# Patient Record
Sex: FEMALE | Race: WHITE | NOT HISPANIC OR LATINO | Employment: OTHER | URBAN - METROPOLITAN AREA
[De-identification: names, ages, dates, MRNs, and addresses within clinical notes are randomized per-mention and may not be internally consistent; named-entity substitution may affect disease eponyms.]

---

## 2017-02-08 ENCOUNTER — HISTORICAL (OUTPATIENT)
Dept: ADMINISTRATIVE | Facility: HOSPITAL | Age: 72
End: 2017-02-08

## 2017-06-09 ENCOUNTER — HISTORICAL (OUTPATIENT)
Dept: ADMINISTRATIVE | Facility: HOSPITAL | Age: 72
End: 2017-06-09

## 2017-06-09 LAB
EST. AVERAGE GLUCOSE BLD GHB EST-MCNC: 120 MG/DL
HBA1C MFR BLD: 5.8 % (ref 4.2–6.3)

## 2017-06-23 ENCOUNTER — HISTORICAL (OUTPATIENT)
Dept: RADIOLOGY | Facility: HOSPITAL | Age: 72
End: 2017-06-23

## 2017-06-23 LAB — BMD RECOMMENDATION EXT: NORMAL

## 2017-10-13 ENCOUNTER — HISTORICAL (OUTPATIENT)
Dept: ADMINISTRATIVE | Facility: HOSPITAL | Age: 72
End: 2017-10-13

## 2017-10-13 LAB
EST. AVERAGE GLUCOSE BLD GHB EST-MCNC: 123 MG/DL
HBA1C MFR BLD: 5.9 % (ref 4.2–6.3)

## 2017-12-01 ENCOUNTER — HISTORICAL (OUTPATIENT)
Dept: ADMINISTRATIVE | Facility: HOSPITAL | Age: 72
End: 2017-12-01

## 2018-01-04 ENCOUNTER — HISTORICAL (OUTPATIENT)
Dept: ADMINISTRATIVE | Facility: HOSPITAL | Age: 73
End: 2018-01-04

## 2018-03-12 ENCOUNTER — HISTORICAL (OUTPATIENT)
Dept: ADMINISTRATIVE | Facility: HOSPITAL | Age: 73
End: 2018-03-12

## 2018-03-12 LAB
ALBUMIN SERPL-MCNC: 3.9 GM/DL (ref 3.4–5)
ALBUMIN/GLOB SERPL: 1.2 RATIO (ref 1.1–2)
ALP SERPL-CCNC: 81 UNIT/L (ref 38–126)
ALT SERPL-CCNC: 15 UNIT/L (ref 12–78)
AST SERPL-CCNC: 10 UNIT/L (ref 15–37)
BILIRUB SERPL-MCNC: 0.5 MG/DL (ref 0.2–1)
BILIRUBIN DIRECT+TOT PNL SERPL-MCNC: 0.1 MG/DL (ref 0–0.5)
BILIRUBIN DIRECT+TOT PNL SERPL-MCNC: 0.4 MG/DL (ref 0–0.8)
BUN SERPL-MCNC: 20 MG/DL (ref 7–18)
CALCIUM SERPL-MCNC: 9.7 MG/DL (ref 8.5–10.1)
CHLORIDE SERPL-SCNC: 108 MMOL/L (ref 98–107)
CHOLEST SERPL-MCNC: 120 MG/DL (ref 0–200)
CHOLEST/HDLC SERPL: 2.3 {RATIO} (ref 0–4)
CO2 SERPL-SCNC: 27 MMOL/L (ref 21–32)
CREAT SERPL-MCNC: 0.95 MG/DL (ref 0.55–1.02)
CREAT UR-MCNC: 77.8 MG/DL
EST. AVERAGE GLUCOSE BLD GHB EST-MCNC: 123 MG/DL
GLOBULIN SER-MCNC: 3.2 GM/DL (ref 2.4–3.5)
GLUCOSE SERPL-MCNC: 112 MG/DL (ref 74–106)
HBA1C MFR BLD: 5.9 % (ref 4.2–6.3)
HDLC SERPL-MCNC: 53 MG/DL (ref 35–60)
LDLC SERPL CALC-MCNC: 41 MG/DL (ref 0–129)
MICROALBUMIN UR-MCNC: 0.7 MG/DL
MICROALBUMIN/CREAT RATIO PNL UR: 9.1 MG/GM CR (ref 0–30)
POTASSIUM SERPL-SCNC: 4.4 MMOL/L (ref 3.5–5.1)
PROT SERPL-MCNC: 7.1 GM/DL (ref 6.4–8.2)
SODIUM SERPL-SCNC: 140 MMOL/L (ref 136–145)
TRIGL SERPL-MCNC: 130 MG/DL (ref 30–150)
VLDLC SERPL CALC-MCNC: 26 MG/DL

## 2018-09-12 ENCOUNTER — HISTORICAL (OUTPATIENT)
Dept: ADMINISTRATIVE | Facility: HOSPITAL | Age: 73
End: 2018-09-12

## 2018-09-12 LAB
EST. AVERAGE GLUCOSE BLD GHB EST-MCNC: 128 MG/DL
HBA1C MFR BLD: 6.1 % (ref 4.2–6.3)

## 2018-11-29 ENCOUNTER — HISTORICAL (OUTPATIENT)
Dept: CARDIOLOGY | Facility: HOSPITAL | Age: 73
End: 2018-11-29

## 2018-12-04 LAB
LEFT EYE DM RETINOPATHY: NEGATIVE
RIGHT EYE DM RETINOPATHY: NEGATIVE

## 2019-03-18 ENCOUNTER — HISTORICAL (OUTPATIENT)
Dept: ADMINISTRATIVE | Facility: HOSPITAL | Age: 74
End: 2019-03-18

## 2019-03-18 LAB
ALBUMIN SERPL-MCNC: 3.8 GM/DL (ref 3.4–5)
ALBUMIN/GLOB SERPL: 1.4 {RATIO}
ALP SERPL-CCNC: 79 UNIT/L (ref 38–126)
ALT SERPL-CCNC: 15 UNIT/L (ref 12–78)
AST SERPL-CCNC: 9 UNIT/L (ref 15–37)
BILIRUB SERPL-MCNC: 0.5 MG/DL (ref 0.2–1)
BILIRUBIN DIRECT+TOT PNL SERPL-MCNC: 0.1 MG/DL (ref 0–0.2)
BILIRUBIN DIRECT+TOT PNL SERPL-MCNC: 0.4 MG/DL (ref 0–0.8)
BUN SERPL-MCNC: 15 MG/DL (ref 7–18)
CALCIUM SERPL-MCNC: 9.1 MG/DL (ref 8.5–10.1)
CHLORIDE SERPL-SCNC: 106 MMOL/L (ref 98–107)
CHOLEST SERPL-MCNC: 117 MG/DL (ref 0–200)
CHOLEST/HDLC SERPL: 2.4 {RATIO} (ref 0–4)
CO2 SERPL-SCNC: 29 MMOL/L (ref 21–32)
CREAT SERPL-MCNC: 0.92 MG/DL (ref 0.55–1.02)
CREAT UR-MCNC: 82 MG/DL
EST. AVERAGE GLUCOSE BLD GHB EST-MCNC: 126 MG/DL
GLOBULIN SER-MCNC: 2.8 GM/DL (ref 2.4–3.5)
GLUCOSE SERPL-MCNC: 111 MG/DL (ref 74–106)
HBA1C MFR BLD: 6 % (ref 4.2–6.3)
HDLC SERPL-MCNC: 48 MG/DL (ref 35–60)
LDLC SERPL CALC-MCNC: 36 MG/DL (ref 0–129)
MICROALBUMIN UR-MCNC: 0.8 MG/DL
MICROALBUMIN/CREAT RATIO PNL UR: 9.8 MG/GM CR (ref 0–30)
POTASSIUM SERPL-SCNC: 4.7 MMOL/L (ref 3.5–5.1)
PROT SERPL-MCNC: 6.6 GM/DL (ref 6.4–8.2)
SODIUM SERPL-SCNC: 141 MMOL/L (ref 136–145)
TRIGL SERPL-MCNC: 165 MG/DL (ref 30–150)
VLDLC SERPL CALC-MCNC: 33 MG/DL

## 2019-07-22 ENCOUNTER — HISTORICAL (OUTPATIENT)
Dept: ADMINISTRATIVE | Facility: HOSPITAL | Age: 74
End: 2019-07-22

## 2019-07-22 LAB
EST. AVERAGE GLUCOSE BLD GHB EST-MCNC: 137 MG/DL
HBA1C MFR BLD: 6.4 % (ref 4.2–6.3)

## 2019-10-28 ENCOUNTER — HISTORICAL (OUTPATIENT)
Dept: ADMINISTRATIVE | Facility: HOSPITAL | Age: 74
End: 2019-10-28

## 2019-10-28 LAB
EST. AVERAGE GLUCOSE BLD GHB EST-MCNC: 123 MG/DL
HBA1C MFR BLD: 5.9 % (ref 4.2–6.3)

## 2020-03-03 ENCOUNTER — HISTORICAL (OUTPATIENT)
Dept: ADMINISTRATIVE | Facility: HOSPITAL | Age: 75
End: 2020-03-03

## 2020-03-03 LAB
ALBUMIN SERPL-MCNC: 3.4 GM/DL (ref 3.4–5)
ALBUMIN/GLOB SERPL: 1.2 RATIO (ref 1.1–2)
ALP SERPL-CCNC: 65 UNIT/L (ref 38–126)
ALT SERPL-CCNC: 15 UNIT/L (ref 12–78)
AST SERPL-CCNC: 10 UNIT/L (ref 15–37)
BILIRUB SERPL-MCNC: 0.4 MG/DL (ref 0.2–1)
BILIRUBIN DIRECT+TOT PNL SERPL-MCNC: 0.2 MG/DL (ref 0–0.5)
BILIRUBIN DIRECT+TOT PNL SERPL-MCNC: 0.2 MG/DL (ref 0–0.8)
BUN SERPL-MCNC: 24 MG/DL (ref 7–18)
CALCIUM SERPL-MCNC: 9 MG/DL (ref 8.5–10.1)
CHLORIDE SERPL-SCNC: 107 MMOL/L (ref 98–107)
CHOLEST SERPL-MCNC: 107 MG/DL (ref 0–200)
CHOLEST/HDLC SERPL: 2.2 {RATIO} (ref 0–4)
CO2 SERPL-SCNC: 28 MMOL/L (ref 21–32)
CREAT SERPL-MCNC: 0.97 MG/DL (ref 0.55–1.02)
CREAT UR-MCNC: 62 MG/DL
EST. AVERAGE GLUCOSE BLD GHB EST-MCNC: 123 MG/DL
GLOBULIN SER-MCNC: 2.8 GM/DL (ref 2.4–3.5)
GLUCOSE SERPL-MCNC: 98 MG/DL (ref 74–106)
HBA1C MFR BLD: 5.9 % (ref 4.2–6.3)
HDLC SERPL-MCNC: 48 MG/DL (ref 35–60)
LDLC SERPL CALC-MCNC: 39 MG/DL (ref 0–129)
MICROALBUMIN UR-MCNC: <0.5 MG/DL
MICROALBUMIN/CREAT RATIO PNL UR: <8.1 MG/GM CR (ref 0–30)
POTASSIUM SERPL-SCNC: 4.4 MMOL/L (ref 3.5–5.1)
PROT SERPL-MCNC: 6.2 GM/DL (ref 6.4–8.2)
SODIUM SERPL-SCNC: 142 MMOL/L (ref 136–145)
TRIGL SERPL-MCNC: 101 MG/DL (ref 30–150)
VLDLC SERPL CALC-MCNC: 20 MG/DL

## 2020-07-07 ENCOUNTER — HISTORICAL (OUTPATIENT)
Dept: ADMINISTRATIVE | Facility: HOSPITAL | Age: 75
End: 2020-07-07

## 2020-07-07 LAB
EST. AVERAGE GLUCOSE BLD GHB EST-MCNC: 122.6 MG/DL
HBA1C MFR BLD: 5.9 %

## 2020-09-03 ENCOUNTER — HISTORICAL (OUTPATIENT)
Dept: RADIOLOGY | Facility: HOSPITAL | Age: 75
End: 2020-09-03

## 2020-11-10 ENCOUNTER — HISTORICAL (OUTPATIENT)
Dept: ADMINISTRATIVE | Facility: HOSPITAL | Age: 75
End: 2020-11-10

## 2020-11-10 LAB
EST. AVERAGE GLUCOSE BLD GHB EST-MCNC: 125.5 MG/DL
HBA1C MFR BLD: 6 %

## 2021-03-16 ENCOUNTER — HISTORICAL (OUTPATIENT)
Dept: ADMINISTRATIVE | Facility: HOSPITAL | Age: 76
End: 2021-03-16

## 2021-03-16 LAB
ALBUMIN SERPL-MCNC: 3.4 GM/DL (ref 3.4–4.8)
ALBUMIN/GLOB SERPL: 1.5 RATIO (ref 1.1–2)
ALP SERPL-CCNC: 73 UNIT/L (ref 40–150)
ALT SERPL-CCNC: 8 UNIT/L (ref 0–55)
AST SERPL-CCNC: 11 UNIT/L (ref 5–34)
BILIRUB SERPL-MCNC: 0.4 MG/DL
BILIRUBIN DIRECT+TOT PNL SERPL-MCNC: 0.2 MG/DL (ref 0–0.5)
BILIRUBIN DIRECT+TOT PNL SERPL-MCNC: 0.2 MG/DL (ref 0–0.8)
BUN SERPL-MCNC: 16 MG/DL (ref 9.8–20.1)
CALCIUM SERPL-MCNC: 8.6 MG/DL (ref 8.4–10.2)
CHLORIDE SERPL-SCNC: 109 MMOL/L (ref 98–107)
CHOLEST SERPL-MCNC: 105 MG/DL
CHOLEST/HDLC SERPL: 3 {RATIO} (ref 0–5)
CO2 SERPL-SCNC: 26 MMOL/L (ref 23–31)
CREAT SERPL-MCNC: 0.81 MG/DL (ref 0.55–1.02)
CREAT UR-MCNC: 55.7 MG/DL (ref 45–106)
EST. AVERAGE GLUCOSE BLD GHB EST-MCNC: 116.9 MG/DL
GLOBULIN SER-MCNC: 2.3 GM/DL (ref 2.4–3.5)
GLUCOSE SERPL-MCNC: 94 MG/DL (ref 82–115)
HBA1C MFR BLD: 5.7 %
HDLC SERPL-MCNC: 39 MG/DL (ref 35–60)
LDLC SERPL CALC-MCNC: 47 MG/DL (ref 50–140)
MICROALBUMIN UR-MCNC: 5.2 UG/ML
MICROALBUMIN/CREAT RATIO PNL UR: 9.3 MG/GM CR (ref 0–30)
POTASSIUM SERPL-SCNC: 4.6 MMOL/L (ref 3.5–5.1)
PROT SERPL-MCNC: 5.7 GM/DL (ref 5.8–7.6)
SODIUM SERPL-SCNC: 143 MMOL/L (ref 136–145)
TRIGL SERPL-MCNC: 95 MG/DL (ref 37–140)
VLDLC SERPL CALC-MCNC: 19 MG/DL

## 2021-07-13 ENCOUNTER — HISTORICAL (OUTPATIENT)
Dept: ADMINISTRATIVE | Facility: HOSPITAL | Age: 76
End: 2021-07-13

## 2021-07-13 LAB
EST. AVERAGE GLUCOSE BLD GHB EST-MCNC: 128.4 MG/DL
HBA1C MFR BLD: 6.1 %
URATE SERPL-MCNC: 8.4 MG/DL (ref 2.6–6)

## 2021-10-20 LAB — NONINV COLON CA DNA+OCC BLD SCRN STL QL: POSITIVE

## 2021-11-11 ENCOUNTER — HISTORICAL (OUTPATIENT)
Dept: ADMINISTRATIVE | Facility: HOSPITAL | Age: 76
End: 2021-11-11

## 2021-11-11 LAB
EST. AVERAGE GLUCOSE BLD GHB EST-MCNC: 122.6 MG/DL
HBA1C MFR BLD: 5.9 %
URATE SERPL-MCNC: 6.3 MG/DL (ref 2.6–6)

## 2022-02-02 LAB — CRC RECOMMENDATION EXT: NORMAL

## 2022-03-10 ENCOUNTER — HISTORICAL (OUTPATIENT)
Dept: ADMINISTRATIVE | Facility: HOSPITAL | Age: 77
End: 2022-03-10

## 2022-03-10 LAB
ABS NEUT (OLG): 6.4 (ref 2.1–9.2)
ALBUMIN SERPL-MCNC: 3.5 G/DL (ref 3.4–4.8)
ALBUMIN/GLOB SERPL: 1.1 {RATIO} (ref 1.1–2)
ALP SERPL-CCNC: 72 U/L (ref 40–150)
ALT SERPL-CCNC: 10 U/L (ref 0–55)
AST SERPL-CCNC: 13 U/L (ref 5–34)
BASOPHILS # BLD AUTO: 0.1 10*3/UL (ref 0–0.2)
BASOPHILS NFR BLD AUTO: 1 %
BILIRUB SERPL-MCNC: 0.6 MG/DL
BILIRUBIN DIRECT+TOT PNL SERPL-MCNC: 0.2 (ref 0–0.5)
BILIRUBIN DIRECT+TOT PNL SERPL-MCNC: 0.4 (ref 0–0.8)
BUN SERPL-MCNC: 19 MG/DL (ref 9.8–20.1)
CALCIUM SERPL-MCNC: 9.6 MG/DL (ref 8.7–10.5)
CHLORIDE SERPL-SCNC: 105 MMOL/L (ref 98–107)
CHOLEST SERPL-MCNC: 116 MG/DL
CHOLEST/HDLC SERPL: 3 {RATIO} (ref 0–5)
CO2 SERPL-SCNC: 27 MMOL/L (ref 23–31)
CREAT SERPL-MCNC: 0.86 MG/DL (ref 0.55–1.02)
CREAT UR-MCNC: 109 MG/DL (ref 45–106)
EOSINOPHIL # BLD AUTO: 0.1 10*3/UL (ref 0–0.9)
EOSINOPHIL NFR BLD AUTO: 1 %
ERYTHROCYTE [DISTWIDTH] IN BLOOD BY AUTOMATED COUNT: 15.4 % (ref 11.5–17)
EST. AVERAGE GLUCOSE BLD GHB EST-MCNC: 119.8 MG/DL
GLOBULIN SER-MCNC: 3.1 G/DL (ref 2.4–3.5)
GLUCOSE SERPL-MCNC: 103 MG/DL (ref 82–115)
HBA1C MFR BLD: 5.8 %
HCT VFR BLD AUTO: 41.1 % (ref 37–47)
HDLC SERPL-MCNC: 43 MG/DL (ref 35–60)
HEMOLYSIS INTERF INDEX SERPL-ACNC: 5
HGB BLD-MCNC: 13.5 G/DL (ref 12–16)
ICTERIC INTERF INDEX SERPL-ACNC: 0
LDLC SERPL CALC-MCNC: 49 MG/DL (ref 50–140)
LIPEMIC INTERF INDEX SERPL-ACNC: 0
LYMPHOCYTES # BLD AUTO: 2.2 10*3/UL (ref 0.6–4.6)
LYMPHOCYTES NFR BLD AUTO: 23 %
MANUAL DIFF? (OHS): NO
MCH RBC QN AUTO: 28.6 PG (ref 27–31)
MCHC RBC AUTO-ENTMCNC: 32.8 G/DL (ref 33–36)
MCV RBC AUTO: 87.1 FL (ref 80–94)
MICROALBUMIN UR-MCNC: 11
MICROALBUMIN/CREAT RATIO PNL UR: 10.1 (ref 0–30)
MONOCYTES # BLD AUTO: 0.6 10*3/UL (ref 0.1–1.3)
MONOCYTES NFR BLD AUTO: 6 %
NEUTROPHILS # BLD AUTO: 6.4 10*3/UL (ref 2.1–9.2)
NEUTROPHILS NFR BLD AUTO: 68 %
PLATELET # BLD AUTO: 212 10*3/UL (ref 130–400)
PMV BLD AUTO: 11.1 FL (ref 9.4–12.4)
POTASSIUM SERPL-SCNC: 4 MMOL/L (ref 3.5–5.1)
PROT SERPL-MCNC: 6.6 G/DL (ref 5.8–7.6)
RBC # BLD AUTO: 4.72 10*6/UL (ref 4.2–5.4)
SODIUM SERPL-SCNC: 142 MMOL/L (ref 136–145)
TRIGL SERPL-MCNC: 119 MG/DL (ref 37–140)
VLDLC SERPL CALC-MCNC: 24 MG/DL
WBC # SPEC AUTO: 9.4 10*3/UL (ref 4.5–11.5)

## 2022-03-29 LAB
LEFT EYE DM RETINOPATHY: NEGATIVE
RIGHT EYE DM RETINOPATHY: NEGATIVE

## 2022-04-11 ENCOUNTER — HISTORICAL (OUTPATIENT)
Dept: ADMINISTRATIVE | Facility: HOSPITAL | Age: 77
End: 2022-04-11

## 2022-04-27 VITALS
BODY MASS INDEX: 42.17 KG/M2 | OXYGEN SATURATION: 100 % | HEIGHT: 64 IN | SYSTOLIC BLOOD PRESSURE: 130 MMHG | WEIGHT: 247 LBS | DIASTOLIC BLOOD PRESSURE: 62 MMHG

## 2022-04-30 NOTE — OP NOTE
Patient:   Jamilah Malone            MRN: [id number]            FIN: [id number]               Age:   72 years     Sex:  Female     :  1945   Associated Diagnoses:   None   Author:   Suleiman Luo MD      Preoperative Diagnosis: Cataract Left eye    Postoperative Diagnosis: Cataract Left eye    Procedure: Phacoemulsification with intaocular lens implantations Left eye    Surgeon: Suleiman Luo MD    Assistant: Khushboo Ridley, [facility]    Anestheisa: Topical    Complications: None    The patient was brought into the operating suite, where the patient was correctly identified as was the operative eye via timeout.  The patient was prepped and draped in a sterile ophthalmic fashion.  A lid speculum was placed in the operative eye and the microscope was brought into place.  A 1.0mm paracentesis was then made at (6) o'clock.  The anterior chamber was filled with Endocoat.  A (temporal) clear corneal incision was made with a 2.4 mm keratome.  A 6 mm corneal marking ring was used to jeremias the cornea centered over the visual axis.  A 5.00 mm continuous curvilinear capsulorhexis was fashioned using a cystotome and microcapsular forceps.  Hydrodissection and hydrodelineation was performed with upreserved 1% Xylocaine.  The nucleus was then phacoemulsified with the Abbott phacoemulsification hand-piece with a total of (2) EFX.  The cortex was then removed with the Eleazar I/A hand-piece. An RAKESH lens model (ZCB00) with a power of (21.5) was placed in the capsular bag.  The Helon was then removed from the eye with the Eleazar I/A hand piece. The anterior chamber was inflated and the wounds were hydrated with BSS.  The wounds were checked with Weck-Britany sponges and found to be watertight.  The lid speculum was removed and topical antibiotics were placed on the operative eye.  The patient was brought to PACU in good condition.      Surgery Date 17 [facility]

## 2022-04-30 NOTE — OP NOTE
Patient:   Jamilah Malone            MRN: [id number]            FIN: [id number]               Age:   72 years     Sex:  Female     :  1945   Associated Diagnoses:   None   Author:   Suleiman Luo MD      Preoperative Diagnosis: Cataract Right eye    Postoperative Diagnosis: Cataract Right eye    Procedure: Phacoemulsification with intaocular lens implantations Right eye    Surgeon: Suleiman Luo MD    Assistant: Khushboo Ridley, [facility]    Anestheisa: Topical    Complications: None    The patient was brought into the operating suite, where the patient was correctly identified as was the operative eye via timeout.  The patient was prepped and draped in a sterile ophthalmic fashion.  A lid speculum was placed in the operative eye and the microscope was brought into place.  A 1.0mm paracentesis was then made at (12) o'clock.  The anterior chamber was filled with Endocoat.  A (temporal) clear corneal incision was made with a 2.4 mm keratome.  A 6 mm corneal marking ring was used to jeremias the cornea centered over the visual axis.  A 5.00 mm continuous curvilinear capsulorhexis was fashioned using a cystotome and microcapsular forceps.  Hydrodissection and hydrodelineation was performed with upreserved 1% Xylocaine.  The nucleus was then phacoemulsified with the Abbott phacoemulsification hand-piece with a total of (2) EFX.  The cortex was then removed with the Eleazar I/A hand-piece. An RAKESH lens model (ZCB00) with a power of (21.5) was placed in the capsular bag.  The Helon was then removed from the eye with the Eleazar I/A hand piece. The anterior chamber was inflated and the wounds were hydrated with BSS.  The wounds were checked with Weck-Britany sponges and found to be watertight.  The lid speculum was removed and topical antibiotics were placed on the operative eye.  The patient was brought to PACU in good condition.        Surgery Date 17 [facility]

## 2022-06-30 DIAGNOSIS — E11.9 DIABETES MELLITUS WITHOUT COMPLICATION: Primary | ICD-10-CM

## 2022-07-05 ENCOUNTER — PATIENT OUTREACH (OUTPATIENT)
Dept: ADMINISTRATIVE | Facility: HOSPITAL | Age: 77
End: 2022-07-05

## 2022-07-05 NOTE — PROGRESS NOTES
Population Health Outreach.Records Received, hyper-linked into chart at this time. The following record(s)  below were uploaded for Health Maintenance .    6/23/2017-DEXA SCAN  10/20/2021-COLOGUARD  2/22/2022-COLONOSCOPY

## 2022-07-14 ENCOUNTER — TELEPHONE (OUTPATIENT)
Dept: INTERNAL MEDICINE | Facility: CLINIC | Age: 77
End: 2022-07-14
Payer: MEDICARE

## 2022-07-14 DIAGNOSIS — E11.9 TYPE 2 DIABETES MELLITUS WITHOUT COMPLICATION, UNSPECIFIED WHETHER LONG TERM INSULIN USE: Primary | ICD-10-CM

## 2022-07-14 NOTE — TELEPHONE ENCOUNTER
----- Message from Poonam Lejeune sent at 7/14/2022 11:25 AM CDT -----  Regarding: RE: Elysia Saleh 7/20/22 @1120  Pt informed of OV, labs, and check in protocol.  She verbalized understanding.   ----- Message -----  From: Flory Keane LPN  Sent: 7/14/2022  10:24 AM CDT  To: Cheryl Lejeune  Subject: Elysia Saleh 7/20/22 @1120                        Are there any outstanding tasks in the chart? Pt will need fasting labs    Is there any documentation of tasks? No    Has patient seen another physician, been to the ER, C, or admitted to hospital since last visit?    Has the patient done blood work or imaging since last visit?

## 2022-07-15 ENCOUNTER — LAB VISIT (OUTPATIENT)
Dept: LAB | Facility: HOSPITAL | Age: 77
End: 2022-07-15
Attending: INTERNAL MEDICINE
Payer: MEDICARE

## 2022-07-15 DIAGNOSIS — E11.9 TYPE 2 DIABETES MELLITUS WITHOUT COMPLICATION, UNSPECIFIED WHETHER LONG TERM INSULIN USE: ICD-10-CM

## 2022-07-15 LAB
EST. AVERAGE GLUCOSE BLD GHB EST-MCNC: 119.8 MG/DL
HBA1C MFR BLD: 5.8 %

## 2022-07-15 PROCEDURE — 36415 COLL VENOUS BLD VENIPUNCTURE: CPT

## 2022-07-15 PROCEDURE — 83036 HEMOGLOBIN GLYCOSYLATED A1C: CPT

## 2022-07-20 ENCOUNTER — OFFICE VISIT (OUTPATIENT)
Dept: INTERNAL MEDICINE | Facility: CLINIC | Age: 77
End: 2022-07-20
Payer: MEDICARE

## 2022-07-20 VITALS
OXYGEN SATURATION: 98 % | DIASTOLIC BLOOD PRESSURE: 64 MMHG | BODY MASS INDEX: 41.62 KG/M2 | HEART RATE: 76 BPM | WEIGHT: 243.81 LBS | HEIGHT: 64 IN | SYSTOLIC BLOOD PRESSURE: 118 MMHG | RESPIRATION RATE: 18 BRPM

## 2022-07-20 DIAGNOSIS — E55.9 VITAMIN D DEFICIENCY: ICD-10-CM

## 2022-07-20 DIAGNOSIS — I10 PRIMARY HYPERTENSION: ICD-10-CM

## 2022-07-20 DIAGNOSIS — I48.91 ATRIAL FIBRILLATION, UNSPECIFIED TYPE: ICD-10-CM

## 2022-07-20 DIAGNOSIS — F32.A DEPRESSION, UNSPECIFIED DEPRESSION TYPE: ICD-10-CM

## 2022-07-20 DIAGNOSIS — E66.01 MORBID (SEVERE) OBESITY DUE TO EXCESS CALORIES: Primary | ICD-10-CM

## 2022-07-20 DIAGNOSIS — Z78.0 MENOPAUSE: ICD-10-CM

## 2022-07-20 DIAGNOSIS — Z11.59 NEED FOR HEPATITIS C SCREENING TEST: ICD-10-CM

## 2022-07-20 DIAGNOSIS — E11.9 TYPE 2 DIABETES MELLITUS WITHOUT COMPLICATION, WITHOUT LONG-TERM CURRENT USE OF INSULIN: ICD-10-CM

## 2022-07-20 DIAGNOSIS — Z12.31 OTHER SCREENING MAMMOGRAM: ICD-10-CM

## 2022-07-20 PROBLEM — N20.0 KIDNEY STONES: Status: ACTIVE | Noted: 2022-07-20

## 2022-07-20 PROBLEM — E78.5 HYPERLIPIDEMIA: Status: ACTIVE | Noted: 2022-07-20

## 2022-07-20 PROBLEM — N20.0 KIDNEY STONES: Status: RESOLVED | Noted: 2022-07-20 | Resolved: 2022-07-20

## 2022-07-20 PROBLEM — M19.90 OSTEOARTHRITIS: Status: ACTIVE | Noted: 2022-07-20

## 2022-07-20 PROBLEM — M10.9 GOUT: Status: ACTIVE | Noted: 2022-07-20

## 2022-07-20 PROBLEM — K21.9 GERD (GASTROESOPHAGEAL REFLUX DISEASE): Status: ACTIVE | Noted: 2022-07-20

## 2022-07-20 PROBLEM — M85.80 OSTEOPENIA: Status: ACTIVE | Noted: 2022-07-20

## 2022-07-20 PROCEDURE — 99213 PR OFFICE/OUTPT VISIT, EST, LEVL III, 20-29 MIN: ICD-10-PCS | Mod: ,,, | Performed by: INTERNAL MEDICINE

## 2022-07-20 PROCEDURE — 99213 OFFICE O/P EST LOW 20 MIN: CPT | Mod: ,,, | Performed by: INTERNAL MEDICINE

## 2022-07-20 RX ORDER — ATORVASTATIN CALCIUM 20 MG/1
TABLET, FILM COATED ORAL
COMMUNITY
Start: 2021-12-08 | End: 2022-09-19

## 2022-07-20 RX ORDER — METFORMIN HYDROCHLORIDE 500 MG/1
TABLET ORAL
COMMUNITY
Start: 2022-03-28 | End: 2023-05-30

## 2022-07-20 RX ORDER — MINERAL OIL
180 ENEMA (ML) RECTAL DAILY
COMMUNITY

## 2022-07-20 RX ORDER — APIXABAN 5 MG/1
5 TABLET, FILM COATED ORAL 2 TIMES DAILY
COMMUNITY
Start: 2022-06-28

## 2022-07-20 RX ORDER — LISINOPRIL 20 MG/1
TABLET ORAL
COMMUNITY
Start: 2022-01-19 | End: 2022-08-29

## 2022-07-20 RX ORDER — ALLOPURINOL 100 MG/1
100 TABLET ORAL
COMMUNITY
Start: 2022-02-01 | End: 2022-12-28

## 2022-07-20 RX ORDER — DEXTROMETHORPHAN HYDROBROMIDE, GUAIFENESIN 5; 100 MG/5ML; MG/5ML
650 LIQUID ORAL EVERY 8 HOURS PRN
COMMUNITY

## 2022-07-20 RX ORDER — ESOMEPRAZOLE MAGNESIUM 40 MG/1
40 CAPSULE, DELAYED RELEASE ORAL
COMMUNITY

## 2022-07-20 RX ORDER — DRONEDARONE 400 MG/1
1 TABLET, FILM COATED ORAL 2 TIMES DAILY
COMMUNITY
Start: 2022-07-05

## 2022-07-20 RX ORDER — DULOXETIN HYDROCHLORIDE 60 MG/1
120 CAPSULE, DELAYED RELEASE ORAL
COMMUNITY
Start: 2021-11-15 | End: 2023-05-22

## 2022-07-20 NOTE — PROGRESS NOTES
Subjective:      Chief Complaint: Follow-up (4mo- A1c 5.8)      HPI:  She is here for f/u DM.  She c/o fatigue.  NO other issues.  She occ feels ilght headed, but doesn't think its her BP.  Although her BP does run low at times.   Problem Noted   Depression 7/20/2022   Gerd (Gastroesophageal Reflux Disease) 7/20/2022   Gout 7/20/2022   Hyperlipidemia 7/20/2022   Osteoarthritis 7/20/2022   Osteopenia 7/20/2022   Vitamin D Deficiency 7/20/2022   Morbid (Severe) Obesity Due to Excess Calories 7/20/2022   Atrial Fibrillation 7/16/2014    followed by Dr. Song at [place]       Htn (Hypertension) 6/2/2014   Diabetes Mellitus 1/30/2014   Kidney Stones (Resolved) 7/20/2022   Miscarriage (Resolved) 7/2/2013    Formatting of this note might be different from the original.  2 miscarriages          The patient's Health Maintenance was reviewed and the following appears to be due:   Health Maintenance Due   Topic Date Due    Hepatitis C Screening  Never done    TETANUS VACCINE  Never done    Shingles Vaccine (1 of 2) Never done    Pneumococcal Vaccines (Age 65+) (2 - PPSV23 or PCV20) 10/07/2016    DEXA Scan  06/23/2019    Mammogram  09/03/2021    COVID-19 Vaccine (3 - Booster for Pfizer series) 11/17/2021       Past Medical History:  Past Medical History:   Diagnosis Date    Anxiety disorder, unspecified     Arthritis     Depression     Diverticulosis of sigmoid colon 02/02/2022    COLONOSCOPY    GERD (gastroesophageal reflux disease)     Kidney stones 7/20/2022    Miscarriage 7/2/2013    Formatting of this note might be different from the original. 2 miscarriages    Personal history of colonic polyps 02/02/2022    COLONOSCOPY    Premature ventricular complex     Vertigo      Past Surgical History:   Procedure Laterality Date    APPENDECTOMY      CATARACT EXTRACTION      CHOLECYSTECTOMY      COLONOSCOPY  02/02/2022    Steven Gilbert MD     Review of patient's allergies indicates:   Allergen Reactions     Cephalexin     Sulfa (sulfonamide antibiotics)      Current Outpatient Medications on File Prior to Visit   Medication Sig Dispense Refill    acetaminophen (TYLENOL) 650 MG TbSR Take 650 mg by mouth every 8 (eight) hours as needed.      allopurinoL (ZYLOPRIM) 100 MG tablet Take 100 mg by mouth.      atorvastatin (LIPITOR) 20 MG tablet   See Instructions, TAKE 1 TABLET BY MOUTH ONCE DAILY, # 90 tab(s), 3 Refill(s), Pharmacy: "[facility] STORE #[id number], 163, cm, Height/Length Dosing, 11/15/21 13:24:00 CST, 112.03, kg, Weight Dosing, 11/15/21 13:24:00 CST      DULoxetine (CYMBALTA) 60 MG capsule Take 120 mg by mouth.      ELIQUIS 5 mg Tab Take 5 mg by mouth 2 (two) times daily.      esomeprazole (NEXIUM) 40 MG capsule Take 40 mg by mouth before breakfast.      fexofenadine (ALLEGRA) 180 MG tablet Take 180 mg by mouth once daily.      lisinopriL (PRINIVIL,ZESTRIL) 20 MG tablet   See Instructions, TAKE 1 TABLET BY MOUTH DAILY, # 90 tab(s), 3 Refill(s), Pharmacy: "[facility] STORE #[id number], 163, cm, Height/Length Dosing, 03/16/22 10:32:00 CDT, 109, kg, Weight Dosing, 03/16/22 10:32:00 CDT      metFORMIN (GLUCOPHAGE) 500 MG tablet   See Instructions, TAKE 1 TABLET BY MOUTH TWICE DAILY, # 180 tab(s), 3 Refill(s), Pharmacy: "[facility] STORE #[id number], 163, cm, Height/Length Dosing, 03/16/22 10:32:00 CDT, 109, kg, Weight Dosing, 03/16/22 10:32:00 CDT      MULTAQ 400 mg Tab Take 1 tablet by mouth 2 (two) times daily.       No current facility-administered medications on file prior to visit.     Social History     Socioeconomic History    Marital status:    Tobacco Use    Smoking status: Never Smoker    Smokeless tobacco: Never Used   Substance and Sexual Activity    Alcohol use: Never     Family History   Problem Relation Age of Onset    Heart disease Mother     Heart disease Father     Diabetes type II Sister     Heart disease Sister     Heart murmur Sister     Diabetes type II Brother      "Heart disease Brother        Review of Systems    Objective:   /64 (BP Location: Left arm, Patient Position: Sitting, BP Method: Large (Manual))   Pulse 76   Resp 18   Ht 5' 4.17" (1.63 m)   Wt 110.6 kg (243 lb 12.8 oz)   SpO2 98%   BMI 41.62 kg/m²     Physical Exam  Vitals reviewed.   Constitutional:       General: She is not in acute distress.     Appearance: Normal appearance. She is not ill-appearing or diaphoretic.   HENT:      Head: Normocephalic and atraumatic.   Pulmonary:      Effort: Pulmonary effort is normal.   Skin:     General: Skin is warm and dry.   Neurological:      General: No focal deficit present.      Mental Status: She is alert.   Psychiatric:         Mood and Affect: Mood normal.         Behavior: Behavior normal.         Thought Content: Thought content normal.         Judgment: Judgment normal.     A1C reviewed and discussed.  Assessment and Plan:     Diabetes mellitus  Controlled, cont med.       Depression  Controlled, cont med.       HTN (hypertension)  Controlled, cont med. I offered to cut the lisionpril in half, but she wants to monitor a little longer.       Follow up in about 4 months (around 11/20/2022).       [unfilled]  Orders Placed This Encounter   Procedures    DXA Bone Density Spine And Hip     Standing Status:   Future     Standing Expiration Date:   7/20/2025     Order Specific Question:   May the Radiologist modify the order per protocol to meet the clinical needs of the patient?     Answer:   Yes     Order Specific Question:   Release to patient     Answer:   Immediate    Mammo Digital Screening Bilat w/ Brett     Standing Status:   Future     Standing Expiration Date:   7/20/2023     Order Specific Question:   Has patient had radiation?     Answer:   No     Order Specific Question:   Has the patient had chemotherapy?     Answer:   No     Order Specific Question:   May the Radiologist modify the order per protocol to meet the clinical needs of the patient?    "  Answer:   Yes     Order Specific Question:   Release to patient     Answer:   Immediate    Hepatitis C Antibody     Standing Status:   Future     Standing Expiration Date:   1/20/2024     Order Specific Question:   Release to patient     Answer:   Immediate    Hemoglobin A1C     Standing Status:   Future     Standing Expiration Date:   1/20/2024    Vitamin D     Standing Status:   Future     Standing Expiration Date:   9/18/2023

## 2022-09-20 ENCOUNTER — HOSPITAL ENCOUNTER (OUTPATIENT)
Dept: RADIOLOGY | Facility: HOSPITAL | Age: 77
Discharge: HOME | End: 2022-09-20
Attending: INTERNAL MEDICINE
Payer: MEDICARE

## 2022-09-20 DIAGNOSIS — Z78.0 MENOPAUSE: ICD-10-CM

## 2022-09-20 DIAGNOSIS — Z12.31 OTHER SCREENING MAMMOGRAM: ICD-10-CM

## 2022-09-20 PROCEDURE — 77081 DXA BONE DENSITY APPENDICULR: CPT | Mod: TC,59

## 2022-09-20 PROCEDURE — 77067 SCR MAMMO BI INCL CAD: CPT | Mod: 26,,, | Performed by: STUDENT IN AN ORGANIZED HEALTH CARE EDUCATION/TRAINING PROGRAM

## 2022-09-20 PROCEDURE — 77067 MAMMO DIGITAL SCREENING BILAT WITH TOMO: ICD-10-PCS | Mod: 26,,, | Performed by: STUDENT IN AN ORGANIZED HEALTH CARE EDUCATION/TRAINING PROGRAM

## 2022-09-20 PROCEDURE — 77081 DXA BONE DENSITY APPENDICULR: CPT | Mod: 26,,, | Performed by: RADIOLOGY

## 2022-09-20 PROCEDURE — 77067 SCR MAMMO BI INCL CAD: CPT | Mod: TC

## 2022-09-20 PROCEDURE — 77080 DXA BONE DENSITY AXIAL: CPT | Mod: 26,,, | Performed by: RADIOLOGY

## 2022-09-20 PROCEDURE — 77080 DXA BONE DENSITY AXIAL: CPT | Mod: TC

## 2022-09-20 PROCEDURE — 77080 DEXA BONE DENSITY SPINE HIP: ICD-10-PCS | Mod: 26,,, | Performed by: RADIOLOGY

## 2022-09-20 PROCEDURE — 77081 DEXA BONE DENSITY APPENDICULAR SKELETON: ICD-10-PCS | Mod: 26,,, | Performed by: RADIOLOGY

## 2022-09-20 PROCEDURE — 77063 MAMMO DIGITAL SCREENING BILAT WITH TOMO: ICD-10-PCS | Mod: 26,,, | Performed by: STUDENT IN AN ORGANIZED HEALTH CARE EDUCATION/TRAINING PROGRAM

## 2022-09-20 PROCEDURE — 77063 BREAST TOMOSYNTHESIS BI: CPT | Mod: 26,,, | Performed by: STUDENT IN AN ORGANIZED HEALTH CARE EDUCATION/TRAINING PROGRAM

## 2022-11-15 ENCOUNTER — TELEPHONE (OUTPATIENT)
Dept: INTERNAL MEDICINE | Facility: CLINIC | Age: 77
End: 2022-11-15
Payer: MEDICARE

## 2022-11-15 NOTE — TELEPHONE ENCOUNTER
----- Message from Flory Keane LPN sent at 11/15/2022  1:47 PM CST -----  Regarding: THOMAS Saleh 11/22/22 @11:00a  Are there any outstanding tasks in the chart? Pt will need nonfasting labs    Is there any documentation of tasks? no    Has patient seen another physician, been to the ER, UCC, or admitted to hospital since last visit?    Has the patient done blood work or imaging since last visit?

## 2022-11-17 ENCOUNTER — LAB VISIT (OUTPATIENT)
Dept: LAB | Facility: HOSPITAL | Age: 77
End: 2022-11-17
Attending: INTERNAL MEDICINE
Payer: MEDICARE

## 2022-11-17 DIAGNOSIS — E55.9 VITAMIN D DEFICIENCY: ICD-10-CM

## 2022-11-17 DIAGNOSIS — Z11.59 NEED FOR HEPATITIS C SCREENING TEST: ICD-10-CM

## 2022-11-17 DIAGNOSIS — E11.9 TYPE 2 DIABETES MELLITUS WITHOUT COMPLICATION, WITHOUT LONG-TERM CURRENT USE OF INSULIN: ICD-10-CM

## 2022-11-17 LAB
DEPRECATED CALCIDIOL+CALCIFEROL SERPL-MC: 16.1 NG/ML (ref 30–80)
EST. AVERAGE GLUCOSE BLD GHB EST-MCNC: 125.5 MG/DL
HBA1C MFR BLD: 6 %

## 2022-11-17 PROCEDURE — 83036 HEMOGLOBIN GLYCOSYLATED A1C: CPT

## 2022-11-17 PROCEDURE — 86803 HEPATITIS C AB TEST: CPT

## 2022-11-17 PROCEDURE — 36415 COLL VENOUS BLD VENIPUNCTURE: CPT

## 2022-11-17 PROCEDURE — 82306 VITAMIN D 25 HYDROXY: CPT

## 2022-11-18 LAB — HCV AB SERPL QL IA: NONREACTIVE

## 2022-11-22 ENCOUNTER — OFFICE VISIT (OUTPATIENT)
Dept: INTERNAL MEDICINE | Facility: CLINIC | Age: 77
End: 2022-11-22
Payer: MEDICARE

## 2022-11-22 VITALS
HEART RATE: 68 BPM | RESPIRATION RATE: 18 BRPM | HEIGHT: 64 IN | SYSTOLIC BLOOD PRESSURE: 120 MMHG | WEIGHT: 245 LBS | BODY MASS INDEX: 41.83 KG/M2 | OXYGEN SATURATION: 98 % | DIASTOLIC BLOOD PRESSURE: 64 MMHG

## 2022-11-22 DIAGNOSIS — I48.91 ATRIAL FIBRILLATION, UNSPECIFIED TYPE: ICD-10-CM

## 2022-11-22 DIAGNOSIS — E11.9 TYPE 2 DIABETES MELLITUS WITHOUT COMPLICATION, WITHOUT LONG-TERM CURRENT USE OF INSULIN: ICD-10-CM

## 2022-11-22 DIAGNOSIS — Z00.00 ENCOUNTER FOR MEDICARE ANNUAL WELLNESS EXAM: ICD-10-CM

## 2022-11-22 DIAGNOSIS — E55.9 VITAMIN D DEFICIENCY: ICD-10-CM

## 2022-11-22 DIAGNOSIS — M10.9 GOUT, UNSPECIFIED CAUSE, UNSPECIFIED CHRONICITY, UNSPECIFIED SITE: ICD-10-CM

## 2022-11-22 DIAGNOSIS — I10 PRIMARY HYPERTENSION: ICD-10-CM

## 2022-11-22 DIAGNOSIS — Z23 FLU VACCINE NEED: Primary | ICD-10-CM

## 2022-11-22 DIAGNOSIS — E78.5 HYPERLIPIDEMIA, UNSPECIFIED HYPERLIPIDEMIA TYPE: ICD-10-CM

## 2022-11-22 PROCEDURE — 90694 VACC AIIV4 NO PRSRV 0.5ML IM: CPT | Mod: ,,, | Performed by: INTERNAL MEDICINE

## 2022-11-22 PROCEDURE — G0439 PR MEDICARE ANNUAL WELLNESS SUBSEQUENT VISIT: ICD-10-PCS | Mod: ,,, | Performed by: INTERNAL MEDICINE

## 2022-11-22 PROCEDURE — G0439 PPPS, SUBSEQ VISIT: HCPCS | Mod: ,,, | Performed by: INTERNAL MEDICINE

## 2022-11-22 PROCEDURE — G0008 FLU VACCINE - QUADRIVALENT - ADJUVANTED: ICD-10-PCS | Mod: ,,, | Performed by: INTERNAL MEDICINE

## 2022-11-22 PROCEDURE — 90694 FLU VACCINE - QUADRIVALENT - ADJUVANTED: ICD-10-PCS | Mod: ,,, | Performed by: INTERNAL MEDICINE

## 2022-11-22 PROCEDURE — G0008 ADMIN INFLUENZA VIRUS VAC: HCPCS | Mod: ,,, | Performed by: INTERNAL MEDICINE

## 2022-11-22 RX ORDER — ERGOCALCIFEROL 1.25 MG/1
50000 CAPSULE ORAL
Qty: 12 CAPSULE | Refills: 3 | Status: SHIPPED | OUTPATIENT
Start: 2022-11-22 | End: 2023-05-24

## 2022-11-22 NOTE — PROGRESS NOTES
Subjective:        Patient Care Team:  Kim Saleh MD as PCP - General (Internal Medicine)  Steven Gilbert MD as Consulting Physician (Gastroenterology)  Fredi Song MD as Consulting Physician (Cardiology)     Chief Complaint: Medicare AWV (Discuss labs )      HPI:  She is here for a medicare wellness.  No complaints.  Her diet isn't great.  She checks her BP periodically, and its been good.  Problem Noted   Encounter for Medicare Annual Wellness Exam 11/22/2022   Depression 7/20/2022   Gerd (Gastroesophageal Reflux Disease) 7/20/2022   Gout 7/20/2022   Hyperlipidemia 7/20/2022   Osteoarthritis 7/20/2022   Osteopenia 7/20/2022   Vitamin D Deficiency 7/20/2022   Morbid (Severe) Obesity Due to Excess Calories 7/20/2022   Atrial Fibrillation 7/16/2014    followed by Dr. Song at [facility]       Htn (Hypertension) 6/2/2014   Diabetes Mellitus 1/30/2014   Kidney Stones (Resolved) 7/20/2022   Miscarriage (Resolved) 7/2/2013    Formatting of this note might be different from the original.  2 miscarriages          The patient's Health Maintenance was reviewed and the following appears to be due:   Health Maintenance Due   Topic Date Due    Foot Exam  Never done    TETANUS VACCINE  Never done    Shingles Vaccine (1 of 2) Never done    Pneumococcal Vaccines (Age 65+) (3 - PPSV23 if available, else PCV20) 01/01/2017    Eye Exam  12/04/2019    COVID-19 Vaccine (3 - Booster for Pfizer series) 08/12/2021       Past Medical History:  Past Medical History:   Diagnosis Date    Anxiety disorder, unspecified     Arthritis     Depression     Diverticulosis of sigmoid colon 02/02/2022    COLONOSCOPY    GERD (gastroesophageal reflux disease)     Kidney stones 7/20/2022    Miscarriage 7/2/2013    Formatting of this note might be different from the original. 2 miscarriages    Personal history of colonic polyps 02/02/2022    COLONOSCOPY    Premature ventricular complex     Vertigo      Past Surgical History:   Procedure Laterality Date     APPENDECTOMY      CATARACT EXTRACTION      CHOLECYSTECTOMY      COLONOSCOPY  02/02/2022    Steven Gilbert MD     Review of patient's allergies indicates:   Allergen Reactions    Cephalexin     Sulfa (sulfonamide antibiotics)      Current Outpatient Medications on File Prior to Visit   Medication Sig Dispense Refill    acetaminophen (TYLENOL) 650 MG TbSR Take 650 mg by mouth every 8 (eight) hours as needed.      allopurinoL (ZYLOPRIM) 100 MG tablet Take 100 mg by mouth.      atorvastatin (LIPITOR) 20 MG tablet TAKE 1 TABLET BY MOUTH ONCE DAILY 90 tablet 3    DULoxetine (CYMBALTA) 60 MG capsule Take 120 mg by mouth.      ELIQUIS 5 mg Tab Take 5 mg by mouth 2 (two) times daily.      esomeprazole (NEXIUM) 40 MG capsule Take 40 mg by mouth before breakfast.      fexofenadine (ALLEGRA) 180 MG tablet Take 180 mg by mouth once daily.      lisinopriL (PRINIVIL,ZESTRIL) 20 MG tablet TAKE 1 TABLET BY MOUTH DAILY 90 tablet 3    metFORMIN (GLUCOPHAGE) 500 MG tablet   See Instructions, TAKE 1 TABLET BY MOUTH TWICE DAILY, # 180 tab(s), 3 Refill(s), Pharmacy: [facility] #[id number], 163, cm, Height/Length Dosing, 03/16/22 10:32:00 CDT, 109, kg, Weight Dosing, 03/16/22 10:32:00 CDT      MULTAQ 400 mg Tab Take 1 tablet by mouth 2 (two) times daily.       No current facility-administered medications on file prior to visit.     Social History     Socioeconomic History    Marital status:    Tobacco Use    Smoking status: Never    Smokeless tobacco: Never   Substance and Sexual Activity    Alcohol use: Never     Family History   Problem Relation Age of Onset    Heart disease Mother     Heart disease Father     Diabetes type II Sister     Heart disease Sister     Heart murmur Sister     Diabetes type II Brother     Heart disease Brother        Review of Systems    Patient Reported Health Risk Assessment  [age]  Are you male or female?: Female  During the past four weeks, how much have you been bothered by  emotional problems such as feeling anxious, depressed, irritable, sad, or downhearted and blue?: Not at all  During the past five weeks, has your physical and/or emotional health limited your social activities with family, friends, neighbors, or groups?: Not at all  During the past four weeks, how much bodily pain have you generally had?: No pain  During the past four weeks, was someone available to help if you needed and wanted help?: Yes, as much as I wanted  During the past four weeks, what was the hardest physical activity you could do for at least two minutes?: Light  Can you get to places out of walking distance without help?  (For example, can you travel alone on buses or taxis, or drive your own car?): Yes  Can you go shopping for groceries or clothes without someone's help?: Yes  Can you prepare your own meals?: Yes  Can you do your own housework without help?: Yes  Because of any health problems, do you need the help of another person with your personal care needs such as eating, bathing, dressing, or getting around the house?: No  Can you handle your own money without help?: Yes  During the past four weeks, how would you rate your health in general?: Good  How have things been going for you during the past four weeks?: Very well  Are you having difficulties driving your car?: No  Do you always fasten your seat belt when you are in a car?: Yes, usually  How often in the past four weeks have you been bothered by falling or dizzy when standing up?: Never  How often in the past four weeks have you been bothered by sexual problems?: Never  How often in the past four weeks have you been bothered by trouble eating well?: Never  How often in the past four weeks have you been bothered by teeth or denture problems?: Never  How often in the past four weeks have you been bothered with problems using the telephone?: Never  How often in the past four weeks have you been bothered by tiredness or fatigue?: Never  Have  "you fallen two or more times in the past year?: No  Are you afraid of falling?: No  Are you a smoker?: No  During the past four weeks, how many drinks of wine, beer, or other alcoholic beverages did you have?: No alcohol at all  Do you exercise for about 20 minutes three or more days a week?: No, I usually do not exercise this much  Have you been given any information to help you with hazards in your house that might hurt you?: Yes  Have you been given any information to help you with keeping track of your medications?: Yes  How often do you have trouble taking medicines the way you've been told to take them?: I always take them as prescribed  How confident are you that you can control and manage most of your health problems?: Very confident  What is your race? (Check all that apply.):     Opioid Screening: Patient medication list reviewed, patient is not taking prescription opioids. Patient is not using additional opioids than prescribed. Patient is at low risk of substance abuse based on this opioid use history.     Objective:   /64 (BP Location: Left arm, Patient Position: Sitting, BP Method: Large (Manual))   Pulse 68   Resp 18   Ht 5' 4.17" (1.63 m)   Wt 111.1 kg (245 lb)   SpO2 98%   BMI 41.83 kg/m²     Physical Exam  Vitals reviewed.   Constitutional:       General: She is not in acute distress.     Appearance: Normal appearance. She is not ill-appearing or diaphoretic.   HENT:      Head: Normocephalic and atraumatic.   Neck:      Vascular: Carotid bruit present.   Cardiovascular:      Rate and Rhythm: Normal rate and regular rhythm.      Pulses: Normal pulses.      Heart sounds: Normal heart sounds.   Pulmonary:      Effort: Pulmonary effort is normal.      Breath sounds: Normal breath sounds.   Musculoskeletal:      Right lower leg: No edema.      Left lower leg: No edema.   Skin:     General: Skin is warm and dry.   Neurological:      General: No focal deficit present.      Mental " Status: She is alert.   Psychiatric:         Mood and Affect: Mood normal.         Behavior: Behavior normal.         Thought Content: Thought content normal.         Judgment: Judgment normal.         No flowsheet data found.  Fall Risk Assessment - Outpatient 11/22/2022 7/20/2022   Mobility Status Ambulatory w/ assistance Ambulatory w/ assistance   Number of falls 0 0   Identified as fall risk 1 0           Depression Screening  Over the past two weeks, has the patient felt down, depressed, or hopeless?: No  Over the past two weeks, has the patient felt little interest or pleasure in doing things?: No  Functional Ability/Safety Screening  Was the patient's timed Up & Go test unsteady or longer than 30 seconds?: No  Does the patient need help with phone, transportation, shopping, preparing meals, housework, laundry, meds, or managing money?: No  Does the patient's home have rugs in the hallway, lack grab bars in the bathroom, lack handrails on the stairs or have poor lighting?: No  Have you noticed any hearing difficulties?: No  Cognitive Function (Assessed through direct observation with due consideration of information obtained by way of patient reports and/or concerns raised by family, friends, caretakers, or others)    Does the patient repeat questions/statements in the same day?: No  Does the patient have trouble remembering the date, year, and time?: No  Does the patient have difficulty managing finances?: No  Does the patient have a decreased sense of direction?: No    Assessment and Plan:     Encounter for Medicare annual wellness exam  Health Maintenance Due   Topic Date Due    TETANUS VACCINE  Never done    Shingles Vaccine (1 of 2) Never done    Pneumococcal Vaccines (Age 65+) (3) 01/01/2017    COVID-19 Vaccine (3 - Booster for Pfizer series) 08/12/2021    Influenza Vaccine (1) 09/01/2022         Vitamin D deficiency  Will add high dose Vit D once weekly.  Recheck 4  mos.    Hyperlipidemia  Contlipitor.    HTN (hypertension)  Controlled, cont lisinopril.    Gout  No recent flares, recheck next visit.    Atrial fibrillation  Stable and anticoagulated    Diabetes mellitus  Controlled, continue metformin.     Follow up in about 4 months (around 3/22/2023).    Medications Ordered This Encounter   Medications    ergocalciferol (ERGOCALCIFEROL) 50,000 unit Cap     Sig: Take 1 capsule (50,000 Units total) by mouth every 7 days.     Dispense:  12 capsule     Refill:  3     [unfilled]  Orders Placed This Encounter   Procedures    Influenza (FLUAD) - Quadrivalent (Adjuvanted) *Preferred* (65+) (PF)    CBC Auto Differential     Standing Status:   Future     Standing Expiration Date:   1/21/2024    Comprehensive Metabolic Panel     Standing Status:   Future     Standing Expiration Date:   1/21/2024    Lipid Panel     Standing Status:   Future     Standing Expiration Date:   5/22/2024    Vitamin D     Standing Status:   Future     Standing Expiration Date:   1/21/2024    Hemoglobin A1C     Standing Status:   Future     Standing Expiration Date:   5/22/2024    Microalbumin/Creatinine Ratio, Urine     Standing Status:   Future     Standing Expiration Date:   1/21/2024     Order Specific Question:   Specimen Source     Answer:   Urine    Uric Acid     Standing Status:   Future     Standing Expiration Date:   1/21/2024         Medicare Annual Wellness and Personalized Prevention Plan:   Fall Risk + Home Safety + Hearing Impairment + Depression Screen + Cognitive Impairment Screen + Health Risk Assessment all reviewed    Advance Care Planning   I attest to discussing Advance Care Planning with patient and/or family member.  Education was provided including the importance of the Health Care Power of , Advance Directives, and/or [place] documentation.  The patient expressed understanding to the importance of this information and discussion.       Follow up in about 4 months (around  3/22/2023). In addition to their scheduled follow up, the patient has also been instructed to follow up on as needed basis.

## 2022-11-22 NOTE — ASSESSMENT & PLAN NOTE
Health Maintenance Due   Topic Date Due    TETANUS VACCINE  Never done    Shingles Vaccine (1 of 2) Never done    Pneumococcal Vaccines (Age 65+) (3) 01/01/2017    COVID-19 Vaccine (3 - Booster for Pfizer series) 08/12/2021    Influenza Vaccine (1) 09/01/2022

## 2022-11-28 ENCOUNTER — DOCUMENTATION ONLY (OUTPATIENT)
Dept: ADMINISTRATIVE | Facility: HOSPITAL | Age: 77
End: 2022-11-28
Payer: MEDICARE

## 2023-02-27 PROBLEM — Z00.00 ENCOUNTER FOR MEDICARE ANNUAL WELLNESS EXAM: Status: RESOLVED | Noted: 2022-11-22 | Resolved: 2023-02-27

## 2023-03-15 ENCOUNTER — TELEPHONE (OUTPATIENT)
Dept: INTERNAL MEDICINE | Facility: CLINIC | Age: 78
End: 2023-03-15
Payer: MEDICARE

## 2023-03-15 NOTE — TELEPHONE ENCOUNTER
----- Message from Flory Keane LPN sent at 3/15/2023  9:56 AM CDT -----  Regarding: THOMAS Saleh 3/22/23 @1:20p  Are there any outstanding tasks in the chart? Pt will need fasting labs    Is there any documentation of tasks? no    Has patient seen another physician, been to the ER, C, or admitted to hospital since last visit?    Has the patient done blood work or imaging since last visit?

## 2023-03-20 ENCOUNTER — LAB VISIT (OUTPATIENT)
Dept: LAB | Facility: HOSPITAL | Age: 78
End: 2023-03-20
Attending: INTERNAL MEDICINE
Payer: MEDICARE

## 2023-03-20 DIAGNOSIS — I10 PRIMARY HYPERTENSION: ICD-10-CM

## 2023-03-20 DIAGNOSIS — E11.9 TYPE 2 DIABETES MELLITUS WITHOUT COMPLICATION, WITHOUT LONG-TERM CURRENT USE OF INSULIN: ICD-10-CM

## 2023-03-20 DIAGNOSIS — E78.5 HYPERLIPIDEMIA, UNSPECIFIED HYPERLIPIDEMIA TYPE: ICD-10-CM

## 2023-03-20 DIAGNOSIS — E55.9 VITAMIN D DEFICIENCY: ICD-10-CM

## 2023-03-20 DIAGNOSIS — M10.9 GOUT, UNSPECIFIED CAUSE, UNSPECIFIED CHRONICITY, UNSPECIFIED SITE: ICD-10-CM

## 2023-03-20 LAB
ALBUMIN SERPL-MCNC: 4 G/DL (ref 3.4–4.8)
ALBUMIN/GLOB SERPL: 1.4 RATIO (ref 1.1–2)
ALP SERPL-CCNC: 76 UNIT/L (ref 40–150)
ALT SERPL-CCNC: 10 UNIT/L (ref 0–55)
AST SERPL-CCNC: 14 UNIT/L (ref 5–34)
BASOPHILS # BLD AUTO: 0.08 X10(3)/MCL (ref 0–0.2)
BASOPHILS NFR BLD AUTO: 0.9 %
BILIRUBIN DIRECT+TOT PNL SERPL-MCNC: 0.7 MG/DL
BUN SERPL-MCNC: 15.1 MG/DL (ref 9.8–20.1)
CALCIUM SERPL-MCNC: 9.7 MG/DL (ref 8.4–10.2)
CHLORIDE SERPL-SCNC: 106 MMOL/L (ref 98–107)
CHOLEST SERPL-MCNC: 131 MG/DL
CHOLEST/HDLC SERPL: 3 {RATIO} (ref 0–5)
CO2 SERPL-SCNC: 26 MMOL/L (ref 23–31)
CREAT SERPL-MCNC: 0.99 MG/DL (ref 0.55–1.02)
CREAT UR-MCNC: 143 MG/DL (ref 47–110)
DEPRECATED CALCIDIOL+CALCIFEROL SERPL-MC: 29.1 NG/ML (ref 30–80)
EOSINOPHIL # BLD AUTO: 0.14 X10(3)/MCL (ref 0–0.9)
EOSINOPHIL NFR BLD AUTO: 1.6 %
ERYTHROCYTE [DISTWIDTH] IN BLOOD BY AUTOMATED COUNT: 15.8 % (ref 11.5–17)
EST. AVERAGE GLUCOSE BLD GHB EST-MCNC: 122.6 MG/DL
GFR SERPLBLD CREATININE-BSD FMLA CKD-EPI: 60 MLS/MIN/1.73/M2
GLOBULIN SER-MCNC: 2.8 GM/DL (ref 2.4–3.5)
GLUCOSE SERPL-MCNC: 130 MG/DL (ref 82–115)
HBA1C MFR BLD: 5.9 %
HCT VFR BLD AUTO: 45.5 % (ref 37–47)
HDLC SERPL-MCNC: 47 MG/DL (ref 35–60)
HGB BLD-MCNC: 14.8 G/DL (ref 12–16)
IMM GRANULOCYTES # BLD AUTO: 0.04 X10(3)/MCL (ref 0–0.04)
IMM GRANULOCYTES NFR BLD AUTO: 0.5 %
LDLC SERPL CALC-MCNC: 57 MG/DL (ref 50–140)
LYMPHOCYTES # BLD AUTO: 1.63 X10(3)/MCL (ref 0.6–4.6)
LYMPHOCYTES NFR BLD AUTO: 18.9 %
MCH RBC QN AUTO: 29 PG
MCHC RBC AUTO-ENTMCNC: 32.5 G/DL (ref 33–36)
MCV RBC AUTO: 89.2 FL (ref 80–94)
MICROALBUMIN UR-MCNC: 13.3 UG/ML
MICROALBUMIN/CREAT RATIO PNL UR: 9.3 MG/GM CR (ref 0–30)
MONOCYTES # BLD AUTO: 0.43 X10(3)/MCL (ref 0.1–1.3)
MONOCYTES NFR BLD AUTO: 5 %
NEUTROPHILS # BLD AUTO: 6.32 X10(3)/MCL (ref 2.1–9.2)
NEUTROPHILS NFR BLD AUTO: 73.1 %
NRBC BLD AUTO-RTO: 0 %
PLATELET # BLD AUTO: 264 X10(3)/MCL (ref 130–400)
PMV BLD AUTO: 10.8 FL (ref 7.4–10.4)
POTASSIUM SERPL-SCNC: 4.5 MMOL/L (ref 3.5–5.1)
PROT SERPL-MCNC: 6.8 GM/DL (ref 5.8–7.6)
RBC # BLD AUTO: 5.1 X10(6)/MCL (ref 4.2–5.4)
SODIUM SERPL-SCNC: 143 MMOL/L (ref 136–145)
TRIGL SERPL-MCNC: 134 MG/DL (ref 37–140)
URATE SERPL-MCNC: 6.6 MG/DL (ref 2.6–6)
VLDLC SERPL CALC-MCNC: 27 MG/DL
WBC # SPEC AUTO: 8.6 X10(3)/MCL (ref 4.5–11.5)

## 2023-03-20 PROCEDURE — 80061 LIPID PANEL: CPT

## 2023-03-20 PROCEDURE — 82043 UR ALBUMIN QUANTITATIVE: CPT

## 2023-03-20 PROCEDURE — 85025 COMPLETE CBC W/AUTO DIFF WBC: CPT

## 2023-03-20 PROCEDURE — 84550 ASSAY OF BLOOD/URIC ACID: CPT

## 2023-03-20 PROCEDURE — 80053 COMPREHEN METABOLIC PANEL: CPT

## 2023-03-20 PROCEDURE — 82306 VITAMIN D 25 HYDROXY: CPT

## 2023-03-20 PROCEDURE — 36415 COLL VENOUS BLD VENIPUNCTURE: CPT

## 2023-03-20 PROCEDURE — 83036 HEMOGLOBIN GLYCOSYLATED A1C: CPT

## 2023-03-22 ENCOUNTER — OFFICE VISIT (OUTPATIENT)
Dept: INTERNAL MEDICINE | Facility: CLINIC | Age: 78
End: 2023-03-22
Payer: MEDICARE

## 2023-03-22 VITALS
BODY MASS INDEX: 40.8 KG/M2 | HEIGHT: 64 IN | HEART RATE: 75 BPM | DIASTOLIC BLOOD PRESSURE: 66 MMHG | RESPIRATION RATE: 20 BRPM | OXYGEN SATURATION: 99 % | WEIGHT: 239 LBS | SYSTOLIC BLOOD PRESSURE: 102 MMHG

## 2023-03-22 DIAGNOSIS — M10.9 GOUT, UNSPECIFIED CAUSE, UNSPECIFIED CHRONICITY, UNSPECIFIED SITE: ICD-10-CM

## 2023-03-22 DIAGNOSIS — E55.9 VITAMIN D DEFICIENCY: ICD-10-CM

## 2023-03-22 DIAGNOSIS — I48.91 ATRIAL FIBRILLATION, UNSPECIFIED TYPE: ICD-10-CM

## 2023-03-22 DIAGNOSIS — I10 PRIMARY HYPERTENSION: ICD-10-CM

## 2023-03-22 DIAGNOSIS — E66.01 MORBID (SEVERE) OBESITY DUE TO EXCESS CALORIES: ICD-10-CM

## 2023-03-22 DIAGNOSIS — E78.5 HYPERLIPIDEMIA, UNSPECIFIED HYPERLIPIDEMIA TYPE: ICD-10-CM

## 2023-03-22 DIAGNOSIS — E11.9 TYPE 2 DIABETES MELLITUS WITHOUT COMPLICATION, WITHOUT LONG-TERM CURRENT USE OF INSULIN: ICD-10-CM

## 2023-03-22 DIAGNOSIS — I11.0 HYPERTENSIVE HEART DISEASE WITH HEART FAILURE: Primary | ICD-10-CM

## 2023-03-22 PROCEDURE — 99214 PR OFFICE/OUTPT VISIT, EST, LEVL IV, 30-39 MIN: ICD-10-PCS | Mod: ,,, | Performed by: INTERNAL MEDICINE

## 2023-03-22 PROCEDURE — 99214 OFFICE O/P EST MOD 30 MIN: CPT | Mod: ,,, | Performed by: INTERNAL MEDICINE

## 2023-03-22 NOTE — PROGRESS NOTES
Subjective:      Chief Complaint: Follow-up (4mo/Discuss labs )      HPI:  She is here for f/u a fib, htn, dm type 2.  She has no complaints today.  She reports to some noncompliance with her meds.  She did buy some weekly containers that she is going to start using.  She is having some joint pain.  Denies depressive sx.  Denies gout flares.  Problem Noted   Hypertensive Heart Disease With Heart Failure 3/22/2023   Depression 7/20/2022   Gerd (Gastroesophageal Reflux Disease) 7/20/2022   Gout 7/20/2022   Hyperlipidemia 7/20/2022   Osteoarthritis 7/20/2022   Osteopenia 7/20/2022   Vitamin D Deficiency 7/20/2022   Morbid (Severe) Obesity Due to Excess Calories 7/20/2022   Atrial Fibrillation 7/16/2014    followed by Dr. Song at [facility]       Htn (Hypertension) 6/2/2014   Diabetes Mellitus 1/30/2014   Encounter for Medicare Annual Wellness Exam (Resolved) 11/22/2022   Kidney Stones (Resolved) 7/20/2022   Miscarriage (Resolved) 7/2/2013    Formatting of this note might be different from the original.  2 miscarriages          The patient's Health Maintenance was reviewed and the following appears to be due:   Health Maintenance Due   Topic Date Due    TETANUS VACCINE  Never done    Shingles Vaccine (1 of 2) Never done    Pneumococcal Vaccines (Age 65+) (3 - PPSV23 if available, else PCV20) 01/01/2017    COVID-19 Vaccine (3 - Booster for Pfizer series) 08/12/2021    Foot Exam  11/15/2022    Eye Exam  03/29/2023       Past Medical History:  Past Medical History:   Diagnosis Date    Anxiety disorder, unspecified     Arthritis     Depression     Diverticulosis of sigmoid colon 02/02/2022    COLONOSCOPY    GERD (gastroesophageal reflux disease)     Kidney stones 7/20/2022    Miscarriage 7/2/2013    Formatting of this note might be different from the original. 2 miscarriages    Personal history of colonic polyps 02/02/2022    COLONOSCOPY    Premature ventricular complex     Vertigo      Review of patient's allergies indicates:  "  Allergen Reactions    Cephalexin     Sulfa (sulfonamide antibiotics)      Current Outpatient Medications on File Prior to Visit   Medication Sig Dispense Refill    acetaminophen (TYLENOL) 650 MG TbSR Take 650 mg by mouth every 8 (eight) hours as needed.      allopurinoL (ZYLOPRIM) 100 MG tablet TAKE 1 TABLET BY MOUTH DAILY 90 tablet 3    atorvastatin (LIPITOR) 20 MG tablet TAKE 1 TABLET BY MOUTH ONCE DAILY 90 tablet 3    DULoxetine (CYMBALTA) 60 MG capsule Take 120 mg by mouth.      ELIQUIS 5 mg Tab Take 5 mg by mouth 2 (two) times daily.      ergocalciferol (ERGOCALCIFEROL) 50,000 unit Cap Take 1 capsule (50,000 Units total) by mouth every 7 days. 12 capsule 3    esomeprazole (NEXIUM) 40 MG capsule Take 40 mg by mouth before breakfast.      fexofenadine (ALLEGRA) 180 MG tablet Take 180 mg by mouth once daily.      lisinopriL (PRINIVIL,ZESTRIL) 20 MG tablet TAKE 1 TABLET BY MOUTH DAILY 90 tablet 3    metFORMIN (GLUCOPHAGE) 500 MG tablet   See Instructions, TAKE 1 TABLET BY MOUTH TWICE DAILY, # 180 tab(s), 3 Refill(s), Pharmacy: [id number], 163, cm, Height/Length Dosing, 03/16/22 10:32:00 CDT, 109, kg, Weight Dosing, 03/16/22 10:32:00 CDT      MULTAQ 400 mg Tab Take 1 tablet by mouth 2 (two) times daily.       No current facility-administered medications on file prior to visit.       Review of Systems    Objective:   /66 (BP Location: Right arm, Patient Position: Sitting, BP Method: Large (Manual))   Pulse 75   Resp 20   Ht 5' 4.17" (1.63 m)   Wt 108.4 kg (239 lb)   SpO2 99%   BMI 40.80 kg/m²     Physical Exam  Vitals reviewed.   Constitutional:       General: She is not in acute distress.     Appearance: Normal appearance. She is obese. She is not ill-appearing or diaphoretic.   HENT:      Head: Normocephalic and atraumatic.   Pulmonary:      Effort: Pulmonary effort is normal.   Skin:     General: Skin is warm and dry.   Neurological:      General: No focal deficit present.      " Mental Status: She is alert.   Psychiatric:         Mood and Affect: Mood normal.         Behavior: Behavior normal.         Thought Content: Thought content normal.         Judgment: Judgment normal.     Assessment and Plan:     Vitamin D deficiency  She missed her dose this week.  But level is overall improved.    Hyperlipidemia  Controlled.  Cont statin.    HTN (hypertension)  Controlled.  Cont lisinopril.    Morbid (severe) obesity due to excess calories  She plans to start exercising [facility] at [facility].    Gout  Stable, cont allopurinol.    Atrial fibrillation  Controlled, cont multaq and eliquis.    Diabetes mellitus  Controlled.  Cont metformin.      Follow up in about 4 months (around 7/22/2023).       [unfilled]  No orders of the defined types were placed in this encounter.

## 2023-04-12 ENCOUNTER — DOCUMENTATION ONLY (OUTPATIENT)
Dept: INTERNAL MEDICINE | Facility: CLINIC | Age: 78
End: 2023-04-12
Payer: MEDICARE

## 2023-04-12 LAB
LEFT EYE DM RETINOPATHY: NEGATIVE
RIGHT EYE DM RETINOPATHY: NEGATIVE

## 2023-05-20 DIAGNOSIS — F32.A DEPRESSION, UNSPECIFIED DEPRESSION TYPE: Primary | ICD-10-CM

## 2023-05-22 RX ORDER — DULOXETIN HYDROCHLORIDE 60 MG/1
CAPSULE, DELAYED RELEASE ORAL
Qty: 270 CAPSULE | Refills: 3 | Status: SHIPPED | OUTPATIENT
Start: 2023-05-22

## 2023-05-24 DIAGNOSIS — E55.9 VITAMIN D DEFICIENCY: Primary | ICD-10-CM

## 2023-05-24 RX ORDER — ERGOCALCIFEROL 1.25 MG/1
CAPSULE ORAL
Qty: 12 CAPSULE | Refills: 3 | Status: SHIPPED | OUTPATIENT
Start: 2023-05-24

## 2023-05-30 DIAGNOSIS — E11.9 TYPE 2 DIABETES MELLITUS WITHOUT COMPLICATION, WITHOUT LONG-TERM CURRENT USE OF INSULIN: Primary | ICD-10-CM

## 2023-05-30 RX ORDER — METFORMIN HYDROCHLORIDE 500 MG/1
TABLET ORAL
Qty: 180 TABLET | Refills: 6 | Status: SHIPPED | OUTPATIENT
Start: 2023-05-30

## 2023-07-12 ENCOUNTER — TELEPHONE (OUTPATIENT)
Dept: INTERNAL MEDICINE | Facility: CLINIC | Age: 78
End: 2023-07-12
Payer: MEDICARE

## 2023-07-12 NOTE — TELEPHONE ENCOUNTER
----- Message from Flory Keane LPN sent at 7/12/2023  8:38 AM CDT -----  Regarding: THOMAS Saleh 7/19/23 @1:00p  Are there any outstanding tasks in the chart? Pt will need nonfasting lab    Is there any documentation of tasks? no    Has patient seen another physician, been to the ER, UCC, or admitted to hospital since last visit?    Has the patient done blood work or imaging since last visit?

## 2023-07-17 ENCOUNTER — LAB VISIT (OUTPATIENT)
Dept: LAB | Facility: HOSPITAL | Age: 78
End: 2023-07-17
Attending: INTERNAL MEDICINE
Payer: MEDICARE

## 2023-07-17 DIAGNOSIS — E11.9 TYPE 2 DIABETES MELLITUS WITHOUT COMPLICATION, WITHOUT LONG-TERM CURRENT USE OF INSULIN: ICD-10-CM

## 2023-07-17 LAB
EST. AVERAGE GLUCOSE BLD GHB EST-MCNC: 122.6 MG/DL
HBA1C MFR BLD: 5.9 %

## 2023-07-17 PROCEDURE — 83036 HEMOGLOBIN GLYCOSYLATED A1C: CPT

## 2023-07-17 PROCEDURE — 36415 COLL VENOUS BLD VENIPUNCTURE: CPT

## 2023-07-19 ENCOUNTER — OFFICE VISIT (OUTPATIENT)
Dept: INTERNAL MEDICINE | Facility: CLINIC | Age: 78
End: 2023-07-19
Payer: MEDICARE

## 2023-07-19 VITALS
RESPIRATION RATE: 20 BRPM | SYSTOLIC BLOOD PRESSURE: 128 MMHG | DIASTOLIC BLOOD PRESSURE: 74 MMHG | HEIGHT: 64 IN | OXYGEN SATURATION: 99 % | WEIGHT: 234 LBS | HEART RATE: 93 BPM | BODY MASS INDEX: 39.95 KG/M2

## 2023-07-19 DIAGNOSIS — E78.5 HYPERLIPIDEMIA, UNSPECIFIED HYPERLIPIDEMIA TYPE: ICD-10-CM

## 2023-07-19 DIAGNOSIS — I48.91 ATRIAL FIBRILLATION, UNSPECIFIED TYPE: ICD-10-CM

## 2023-07-19 DIAGNOSIS — E11.9 TYPE 2 DIABETES MELLITUS WITHOUT COMPLICATION, WITHOUT LONG-TERM CURRENT USE OF INSULIN: Primary | ICD-10-CM

## 2023-07-19 DIAGNOSIS — I10 PRIMARY HYPERTENSION: ICD-10-CM

## 2023-07-19 PROCEDURE — 99213 OFFICE O/P EST LOW 20 MIN: CPT | Mod: ,,, | Performed by: INTERNAL MEDICINE

## 2023-07-19 PROCEDURE — 99213 PR OFFICE/OUTPT VISIT, EST, LEVL III, 20-29 MIN: ICD-10-PCS | Mod: ,,, | Performed by: INTERNAL MEDICINE

## 2023-07-19 NOTE — PROGRESS NOTES
Subjective:      Chief Complaint: Follow-up (4mo/Discuss labs )      HPI:She is here for f/u HTN, HLD, DM.  She's been under a lot of stress.  But overall she is feeling good.  She is losing weight, but is only eating one good meal per day.  She has some intermittent anxiety.  Problem Noted   Hypertensive Heart Disease With Heart Failure 3/22/2023   Depression 7/20/2022   Gerd (Gastroesophageal Reflux Disease) 7/20/2022   Gout 7/20/2022   Hyperlipidemia 7/20/2022   Osteoarthritis 7/20/2022   Osteopenia 7/20/2022   Vitamin D Deficiency 7/20/2022   Morbid (Severe) Obesity Due to Excess Calories 7/20/2022   Atrial Fibrillation 7/16/2014    followed by Dr. Song at [facility]       Htn (Hypertension) 6/2/2014   Diabetes Mellitus 1/30/2014   Encounter for Medicare Annual Wellness Exam (Resolved) 11/22/2022   Kidney Stones (Resolved) 7/20/2022   Miscarriage (Resolved) 7/2/2013    Formatting of this note might be different from the original.  2 miscarriages          The patient's Health Maintenance was reviewed and the following appears to be due:   Health Maintenance Due   Topic Date Due    TETANUS VACCINE  Never done    Shingles Vaccine (1 of 2) Never done    Pneumococcal Vaccines (Age 65+) (3 - PPSV23 if available, else PCV20) 01/01/2017    COVID-19 Vaccine (3 - Pfizer series) 08/12/2021       Past Medical History:  Past Medical History:   Diagnosis Date    Anxiety disorder, unspecified     Arthritis     Depression     Diverticulosis of sigmoid colon 02/02/2022    COLONOSCOPY    GERD (gastroesophageal reflux disease)     Kidney stones 7/20/2022    Miscarriage 7/2/2013    Formatting of this note might be different from the original. 2 miscarriages    Personal history of colonic polyps 02/02/2022    COLONOSCOPY    Premature ventricular complex     Vertigo      Review of patient's allergies indicates:   Allergen Reactions    Cephalexin     Sulfa (sulfonamide antibiotics)      Current Outpatient Medications on File Prior to Visit  "  Medication Sig Dispense Refill    acetaminophen (TYLENOL) 650 MG TbSR Take 650 mg by mouth every 8 (eight) hours as needed.      allopurinoL (ZYLOPRIM) 100 MG tablet TAKE 1 TABLET BY MOUTH DAILY 90 tablet 3    atorvastatin (LIPITOR) 20 MG tablet TAKE 1 TABLET BY MOUTH ONCE DAILY 90 tablet 3    DULoxetine (CYMBALTA) 60 MG capsule TAKE 2 CAPSULES BY MOUTH DAILY 270 capsule 3    ELIQUIS 5 mg Tab Take 5 mg by mouth 2 (two) times daily.      ergocalciferol (ERGOCALCIFEROL) 50,000 unit Cap TAKE 1 CAPSULE BY MOUTH EVERY 7 DAYS 12 capsule 3    esomeprazole (NEXIUM) 40 MG capsule Take 40 mg by mouth before breakfast.      fexofenadine (ALLEGRA) 180 MG tablet Take 180 mg by mouth once daily.      lisinopriL (PRINIVIL,ZESTRIL) 20 MG tablet TAKE 1 TABLET BY MOUTH DAILY 90 tablet 3    metFORMIN (GLUCOPHAGE) 500 MG tablet TAKE 1 TABLET BY MOUTH TWICE DAILY 180 tablet 6    MULTAQ 400 mg Tab Take 1 tablet by mouth 2 (two) times daily.       No current facility-administered medications on file prior to visit.       Review of Systems    Objective:   /74 (BP Location: Left arm, Patient Position: Sitting, BP Method: Large (Manual))   Pulse 93   Resp 20   Ht 5' 4.17" (1.63 m)   Wt 106.1 kg (234 lb)   SpO2 99%   BMI 39.95 kg/m²     Physical Exam  Vitals reviewed.   Constitutional:       General: She is not in acute distress.     Appearance: Normal appearance. She is not ill-appearing or diaphoretic.   HENT:      Head: Normocephalic and atraumatic.   Pulmonary:      Effort: Pulmonary effort is normal.   Feet:      Right foot:      Skin integrity: Skin integrity normal.      Left foot:      Skin integrity: Skin integrity normal.      Comments: Protective Sensation (w/ 10 gram monofilament):  Right: Intact  Left: Intact    Visual Inspection:  Normal -  Bilateral    Pedal Pulses:   Right: Present  Left: Present    Posterior tibialis:   Right:Present  Left: Present      Skin:     General: Skin is warm and dry.   Neurological:    "   General: No focal deficit present.      Mental Status: She is alert.   Psychiatric:         Mood and Affect: Mood normal.         Behavior: Behavior normal.         Thought Content: Thought content normal.         Judgment: Judgment normal.     Assessment and Plan:     Diabetes mellitus  Stable, continue metformin.    HTN (hypertension)  Stable.  Continue lisinopril.    Atrial fibrillation  Anticoagulated and rate controlled.      Follow up in about 4 months (around 11/19/2023).       [unfilled]  Orders Placed This Encounter   Procedures    Hemoglobin A1C     Standing Status:   Future     Standing Expiration Date:   1/19/2025

## 2023-09-21 ENCOUNTER — CLINICAL SUPPORT (OUTPATIENT)
Dept: INTERNAL MEDICINE | Facility: CLINIC | Age: 78
End: 2023-09-21
Payer: MEDICARE

## 2023-09-21 DIAGNOSIS — Z23 FLU VACCINE NEED: Primary | ICD-10-CM

## 2023-09-21 PROCEDURE — 90694 VACC AIIV4 NO PRSRV 0.5ML IM: CPT | Mod: ,,, | Performed by: INTERNAL MEDICINE

## 2023-09-21 PROCEDURE — 90694 FLU VACCINE - QUADRIVALENT - ADJUVANTED: ICD-10-PCS | Mod: ,,, | Performed by: INTERNAL MEDICINE

## 2023-09-21 PROCEDURE — G0008 ADMIN INFLUENZA VIRUS VAC: HCPCS | Mod: ,,, | Performed by: INTERNAL MEDICINE

## 2023-09-21 PROCEDURE — G0008 FLU VACCINE - QUADRIVALENT - ADJUVANTED: ICD-10-PCS | Mod: ,,, | Performed by: INTERNAL MEDICINE

## 2023-11-07 ENCOUNTER — TELEPHONE (OUTPATIENT)
Dept: INTERNAL MEDICINE | Facility: CLINIC | Age: 78
End: 2023-11-07
Payer: MEDICARE

## 2023-11-07 NOTE — TELEPHONE ENCOUNTER
----- Message from Flory Keane LPN sent at 11/7/2023  8:11 AM CST -----  Regarding: THOMAS Saleh 11/14/23 @1:20p  Are there any outstanding tasks in the chart? Pt will need nonfasting labs    Is there any documentation of tasks? no    Has patient seen another physician, been to the ER, UCC, or admitted to hospital since last visit?    Has the patient done blood work or imaging since last visit?

## 2023-11-10 ENCOUNTER — LAB VISIT (OUTPATIENT)
Dept: LAB | Facility: HOSPITAL | Age: 78
End: 2023-11-10
Attending: INTERNAL MEDICINE
Payer: MEDICARE

## 2023-11-10 DIAGNOSIS — E11.9 TYPE 2 DIABETES MELLITUS WITHOUT COMPLICATION, WITHOUT LONG-TERM CURRENT USE OF INSULIN: ICD-10-CM

## 2023-11-10 LAB
EST. AVERAGE GLUCOSE BLD GHB EST-MCNC: 128.4 MG/DL
HBA1C MFR BLD: 6.1 %

## 2023-11-10 PROCEDURE — 36415 COLL VENOUS BLD VENIPUNCTURE: CPT

## 2023-11-10 PROCEDURE — 83036 HEMOGLOBIN GLYCOSYLATED A1C: CPT

## 2023-11-14 ENCOUNTER — OFFICE VISIT (OUTPATIENT)
Dept: INTERNAL MEDICINE | Facility: CLINIC | Age: 78
End: 2023-11-14
Payer: MEDICARE

## 2023-11-14 VITALS
RESPIRATION RATE: 18 BRPM | HEART RATE: 93 BPM | OXYGEN SATURATION: 99 % | HEIGHT: 63 IN | WEIGHT: 241.19 LBS | SYSTOLIC BLOOD PRESSURE: 124 MMHG | DIASTOLIC BLOOD PRESSURE: 72 MMHG | BODY MASS INDEX: 42.73 KG/M2

## 2023-11-14 DIAGNOSIS — E78.5 HYPERLIPIDEMIA, UNSPECIFIED HYPERLIPIDEMIA TYPE: ICD-10-CM

## 2023-11-14 DIAGNOSIS — M10.9 GOUT, UNSPECIFIED CAUSE, UNSPECIFIED CHRONICITY, UNSPECIFIED SITE: ICD-10-CM

## 2023-11-14 DIAGNOSIS — E11.9 TYPE 2 DIABETES MELLITUS WITHOUT COMPLICATION, WITHOUT LONG-TERM CURRENT USE OF INSULIN: ICD-10-CM

## 2023-11-14 DIAGNOSIS — I48.91 ATRIAL FIBRILLATION, UNSPECIFIED TYPE: ICD-10-CM

## 2023-11-14 DIAGNOSIS — E55.9 VITAMIN D DEFICIENCY: ICD-10-CM

## 2023-11-14 DIAGNOSIS — I10 PRIMARY HYPERTENSION: Primary | ICD-10-CM

## 2023-11-14 PROCEDURE — 99214 OFFICE O/P EST MOD 30 MIN: CPT | Mod: ,,, | Performed by: INTERNAL MEDICINE

## 2023-11-14 PROCEDURE — 99214 PR OFFICE/OUTPT VISIT, EST, LEVL IV, 30-39 MIN: ICD-10-PCS | Mod: ,,, | Performed by: INTERNAL MEDICINE

## 2023-11-14 NOTE — PROGRESS NOTES
Subjective:      Chief Complaint: Follow-up      HPI:She is here fo rf/u DM, HTN, gout.  Denies gout.  She c/o fatigue.  She is having to help her  who recently had surgery.  She sometimes takes the metformin only once daily.  She is compliant with the lisinopril.    She is admitting to occ missing does of the eliquis at night.    Problem Noted   Depression 7/20/2022   Gerd (Gastroesophageal Reflux Disease) 7/20/2022   Gout 7/20/2022   Hyperlipidemia 7/20/2022   Osteoarthritis 7/20/2022   Osteopenia 7/20/2022   Vitamin D Deficiency 7/20/2022   Morbid (Severe) Obesity Due to Excess Calories 7/20/2022   Atrial Fibrillation 7/16/2014    followed by Dr. Song at [facility]       Htn (Hypertension) 6/2/2014   Diabetes Mellitus 1/30/2014   Encounter for Medicare Annual Wellness Exam (Resolved) 11/22/2022   Kidney Stones (Resolved) 7/20/2022   Miscarriage (Resolved) 7/2/2013    Formatting of this note might be different from the original.  2 miscarriages          The patient's Health Maintenance was reviewed and the following appears to be due:   Health Maintenance Due   Topic Date Due    TETANUS VACCINE  Never done    Shingles Vaccine (1 of 2) Never done    RSV Vaccine (Age 60+) (1 - 1-dose 60+ series) Never done    Pneumococcal Vaccines (Age 65+) (3 - PPSV23 or PCV20) 01/01/2017    COVID-19 Vaccine (3 - 2023-24 season) 09/01/2023       Past Medical History:  Past Medical History:   Diagnosis Date    Anxiety disorder, unspecified     Arthritis     Depression     Diverticulosis of sigmoid colon 02/02/2022    COLONOSCOPY    GERD (gastroesophageal reflux disease)     Kidney stones 7/20/2022    Miscarriage 7/2/2013    Formatting of this note might be different from the original. 2 miscarriages    Personal history of colonic polyps 02/02/2022    COLONOSCOPY    Premature ventricular complex     Vertigo      Review of patient's allergies indicates:   Allergen Reactions    Cephalexin     Sulfa (sulfonamide antibiotics)   "    Current Outpatient Medications on File Prior to Visit   Medication Sig Dispense Refill    acetaminophen (TYLENOL) 650 MG TbSR Take 650 mg by mouth every 8 (eight) hours as needed.      allopurinoL (ZYLOPRIM) 100 MG tablet TAKE 1 TABLET BY MOUTH DAILY 90 tablet 3    atorvastatin (LIPITOR) 20 MG tablet TAKE 1 TABLET BY MOUTH ONCE DAILY 90 tablet 3    DULoxetine (CYMBALTA) 60 MG capsule TAKE 2 CAPSULES BY MOUTH DAILY 270 capsule 3    ELIQUIS 5 mg Tab Take 5 mg by mouth 2 (two) times daily.      ergocalciferol (ERGOCALCIFEROL) 50,000 unit Cap TAKE 1 CAPSULE BY MOUTH EVERY 7 DAYS 12 capsule 3    esomeprazole (NEXIUM) 40 MG capsule Take 40 mg by mouth before breakfast.      fexofenadine (ALLEGRA) 180 MG tablet Take 180 mg by mouth once daily.      lisinopriL (PRINIVIL,ZESTRIL) 20 MG tablet TAKE 1 TABLET BY MOUTH DAILY 90 tablet 3    metFORMIN (GLUCOPHAGE) 500 MG tablet TAKE 1 TABLET BY MOUTH TWICE DAILY 180 tablet 6    MULTAQ 400 mg Tab Take 1 tablet by mouth 2 (two) times daily.       No current facility-administered medications on file prior to visit.       Review of Systems    Objective:   /72 (BP Location: Right arm, Patient Position: Sitting, BP Method: Large (Manual))   Pulse 93   Resp 18   Ht 5' 3" (1.6 m)   Wt 109.4 kg (241 lb 3.2 oz)   SpO2 99%   BMI 42.73 kg/m²     Physical Exam  Vitals reviewed.   Constitutional:       General: She is not in acute distress.     Appearance: Normal appearance. She is not ill-appearing or diaphoretic.   HENT:      Head: Normocephalic and atraumatic.   Neck:      Vascular: No carotid bruit.   Cardiovascular:      Rate and Rhythm: Normal rate and regular rhythm.      Heart sounds: Normal heart sounds.   Pulmonary:      Effort: Pulmonary effort is normal.      Breath sounds: Normal breath sounds.   Musculoskeletal:      Right lower leg: No edema.      Left lower leg: No edema.   Skin:     General: Skin is warm and dry.   Neurological:      General: No focal deficit " present.      Mental Status: She is alert.   Psychiatric:         Mood and Affect: Mood normal.         Behavior: Behavior normal.         Thought Content: Thought content normal.         Judgment: Judgment normal.       Assessment and Plan:     Vitamin D deficiency  Recheck 6 mos    HTN (hypertension)  Controlled, continue lisinopril    Hyperlipidemia  Recheck 6 mos.    Gout  No flares.  Continue allopurinol, recheck uric acid level in 6 mos.    Diabetes mellitus  At goal, continue metformin, recheck 6 mos.    Atrial fibrillation  I encouraged compliance with her eliquis.      Follow up in about 6 months (around 5/14/2024).       [unfilled]  Orders Placed This Encounter   Procedures    CBC Auto Differential     Standing Status:   Future     Standing Expiration Date:   1/12/2025    Comprehensive Metabolic Panel     Standing Status:   Future     Standing Expiration Date:   1/12/2025    Lipid Panel     Standing Status:   Future     Standing Expiration Date:   5/14/2025    Microalbumin/Creatinine Ratio, Urine     Standing Status:   Future     Standing Expiration Date:   1/12/2025     Order Specific Question:   Specimen Source     Answer:   Urine    Hemoglobin A1C     Standing Status:   Future     Standing Expiration Date:   1/12/2025    Vitamin D     Standing Status:   Future     Standing Expiration Date:   1/12/2025    Uric Acid     Standing Status:   Future     Standing Expiration Date:   1/12/2025

## 2024-02-23 ENCOUNTER — HOSPITAL ENCOUNTER (EMERGENCY)
Facility: HOSPITAL | Age: 79
Discharge: HOME | End: 2024-02-23
Attending: EMERGENCY MEDICINE
Payer: MEDICARE

## 2024-02-23 VITALS
OXYGEN SATURATION: 100 % | WEIGHT: 240 LBS | BODY MASS INDEX: 40.97 KG/M2 | DIASTOLIC BLOOD PRESSURE: 80 MMHG | HEART RATE: 80 BPM | HEIGHT: 64 IN | SYSTOLIC BLOOD PRESSURE: 145 MMHG | TEMPERATURE: 98 F | RESPIRATION RATE: 18 BRPM

## 2024-02-23 DIAGNOSIS — T14.8XXA HEMATOMA: Primary | ICD-10-CM

## 2024-02-23 DIAGNOSIS — N75.0 BARTHOLIN CYST: ICD-10-CM

## 2024-02-23 LAB
ALBUMIN SERPL-MCNC: 3.3 G/DL (ref 3.4–4.8)
ALBUMIN/GLOB SERPL: 1.1 RATIO (ref 1.1–2)
ALP SERPL-CCNC: 72 UNIT/L (ref 40–150)
ALT SERPL-CCNC: 7 UNIT/L (ref 0–55)
AST SERPL-CCNC: 9 UNIT/L (ref 5–34)
BASOPHILS # BLD AUTO: 0.07 X10(3)/MCL
BASOPHILS NFR BLD AUTO: 0.9 %
BILIRUB SERPL-MCNC: 0.6 MG/DL
BUN SERPL-MCNC: 17.1 MG/DL (ref 9.8–20.1)
CALCIUM SERPL-MCNC: 9.5 MG/DL (ref 8.4–10.2)
CHLORIDE SERPL-SCNC: 109 MMOL/L (ref 98–107)
CO2 SERPL-SCNC: 20 MMOL/L (ref 23–31)
CREAT SERPL-MCNC: 1.11 MG/DL (ref 0.55–1.02)
EOSINOPHIL # BLD AUTO: 0.18 X10(3)/MCL (ref 0–0.9)
EOSINOPHIL NFR BLD AUTO: 2.2 %
ERYTHROCYTE [DISTWIDTH] IN BLOOD BY AUTOMATED COUNT: 15 % (ref 11.5–17)
GFR SERPLBLD CREATININE-BSD FMLA CKD-EPI: 52 MLS/MIN/1.73/M2
GLOBULIN SER-MCNC: 3.1 GM/DL (ref 2.4–3.5)
GLUCOSE SERPL-MCNC: 126 MG/DL (ref 82–115)
HCT VFR BLD AUTO: 39.6 % (ref 37–47)
HGB BLD-MCNC: 13.2 G/DL (ref 12–16)
IMM GRANULOCYTES # BLD AUTO: 0.02 X10(3)/MCL (ref 0–0.04)
IMM GRANULOCYTES NFR BLD AUTO: 0.2 %
LYMPHOCYTES # BLD AUTO: 1.3 X10(3)/MCL (ref 0.6–4.6)
LYMPHOCYTES NFR BLD AUTO: 15.8 %
MCH RBC QN AUTO: 29.8 PG (ref 27–31)
MCHC RBC AUTO-ENTMCNC: 33.3 G/DL (ref 33–36)
MCV RBC AUTO: 89.4 FL (ref 80–94)
MONOCYTES # BLD AUTO: 0.53 X10(3)/MCL (ref 0.1–1.3)
MONOCYTES NFR BLD AUTO: 6.4 %
NEUTROPHILS # BLD AUTO: 6.13 X10(3)/MCL (ref 2.1–9.2)
NEUTROPHILS NFR BLD AUTO: 74.5 %
NRBC BLD AUTO-RTO: 0 %
PLATELET # BLD AUTO: 204 X10(3)/MCL (ref 130–400)
PMV BLD AUTO: 11.5 FL (ref 7.4–10.4)
POTASSIUM SERPL-SCNC: 4 MMOL/L (ref 3.5–5.1)
PROT SERPL-MCNC: 6.4 GM/DL (ref 5.8–7.6)
RBC # BLD AUTO: 4.43 X10(6)/MCL (ref 4.2–5.4)
SODIUM SERPL-SCNC: 139 MMOL/L (ref 136–145)
WBC # SPEC AUTO: 8.23 X10(3)/MCL (ref 4.5–11.5)

## 2024-02-23 PROCEDURE — 25500020 PHARM REV CODE 255: Performed by: EMERGENCY MEDICINE

## 2024-02-23 PROCEDURE — 85025 COMPLETE CBC W/AUTO DIFF WBC: CPT | Performed by: EMERGENCY MEDICINE

## 2024-02-23 PROCEDURE — 56420 I&D BARTHOLINS GLAND ABSCESS: CPT

## 2024-02-23 PROCEDURE — 25000003 PHARM REV CODE 250: Performed by: EMERGENCY MEDICINE

## 2024-02-23 PROCEDURE — 87070 CULTURE OTHR SPECIMN AEROBIC: CPT | Performed by: EMERGENCY MEDICINE

## 2024-02-23 PROCEDURE — 80053 COMPREHEN METABOLIC PANEL: CPT | Performed by: EMERGENCY MEDICINE

## 2024-02-23 PROCEDURE — 99285 EMERGENCY DEPT VISIT HI MDM: CPT | Mod: 25

## 2024-02-23 RX ORDER — CLINDAMYCIN HYDROCHLORIDE 300 MG/1
300 CAPSULE ORAL
Status: DISCONTINUED | OUTPATIENT
Start: 2024-02-23 | End: 2024-02-23 | Stop reason: HOSPADM

## 2024-02-23 RX ORDER — HYDROCODONE BITARTRATE AND ACETAMINOPHEN 5; 325 MG/1; MG/1
1 TABLET ORAL
Status: DISCONTINUED | OUTPATIENT
Start: 2024-02-23 | End: 2024-02-23 | Stop reason: HOSPADM

## 2024-02-23 RX ORDER — CLINDAMYCIN HYDROCHLORIDE 300 MG/1
300 CAPSULE ORAL 3 TIMES DAILY
Qty: 30 CAPSULE | Refills: 0 | Status: SHIPPED | OUTPATIENT
Start: 2024-02-23 | End: 2024-03-04

## 2024-02-23 RX ORDER — HYDROCODONE BITARTRATE AND ACETAMINOPHEN 5; 325 MG/1; MG/1
1 TABLET ORAL EVERY 8 HOURS PRN
Qty: 8 TABLET | Refills: 0 | Status: SHIPPED | OUTPATIENT
Start: 2024-02-23 | End: 2024-05-31

## 2024-02-23 RX ORDER — LIDOCAINE HYDROCHLORIDE 10 MG/ML
100 INJECTION INFILTRATION; PERINEURAL
Status: COMPLETED | OUTPATIENT
Start: 2024-02-23 | End: 2024-02-23

## 2024-02-23 RX ORDER — CLINDAMYCIN HYDROCHLORIDE 300 MG/1
300 CAPSULE ORAL 3 TIMES DAILY
Qty: 30 CAPSULE | Refills: 0 | Status: SHIPPED | OUTPATIENT
Start: 2024-02-23 | End: 2024-02-23

## 2024-02-23 RX ADMIN — IOPAMIDOL 100 ML: 612 INJECTION, SOLUTION INTRAVENOUS at 04:02

## 2024-02-23 RX ADMIN — LIDOCAINE HYDROCHLORIDE 100 MG: 10 INJECTION, SOLUTION INFILTRATION; PERINEURAL at 03:02

## 2024-02-23 NOTE — DISCHARGE INSTRUCTIONS
You can remove the packing in 24 hours at your next shower. I have sent a referral to [facility] but you may also need to check with medicare for gynecologist that are covered in the area .

## 2024-02-23 NOTE — ED PROVIDER NOTES
Encounter Date: 2/23/2024       History     Chief Complaint   Patient presents with    Hemorrhoids     C/O hemorrhoid was bleeding today. On Eliquis.      Patient is a 79 yo F presenting with bleeding from perineal region. She was sitting in her recliner and felt something draining down her leg. She thought it was likely a hemorrhoid that had ruptured but she also has had 'lump  down there' that she described as itchy and uncomfortable. She is a diabetic but has been compliant with her meds though she hasn't checked any of her glucose levels        Review of patient's allergies indicates:   Allergen Reactions    Cephalexin     Sulfa (sulfonamide antibiotics)      Past Medical History:   Diagnosis Date    Anxiety disorder, unspecified     Arthritis     Depression     Diverticulosis of sigmoid colon 02/02/2022    COLONOSCOPY    GERD (gastroesophageal reflux disease)     Kidney stones 7/20/2022    Miscarriage 7/2/2013    Formatting of this note might be different from the original. 2 miscarriages    Personal history of colonic polyps 02/02/2022    COLONOSCOPY    Premature ventricular complex     Vertigo      Past Surgical History:   Procedure Laterality Date    APPENDECTOMY      CATARACT EXTRACTION      CHOLECYSTECTOMY      COLONOSCOPY  02/02/2022    Steven Gilbert MD     Family History   Problem Relation Age of Onset    Heart disease Mother     Heart disease Father     Diabetes type II Sister     Heart disease Sister     Heart murmur Sister     Diabetes type II Brother     Heart disease Brother      Social History     Tobacco Use    Smoking status: Never    Smokeless tobacco: Never   Substance Use Topics    Alcohol use: Never     Review of Systems   Constitutional:  Negative for fever.   HENT:  Negative for sore throat.    Respiratory:  Negative for shortness of breath.    Cardiovascular:  Negative for chest pain.   Gastrointestinal:  Negative for nausea.   Genitourinary:  Positive for vaginal bleeding. Negative for  dysuria.        Bleeding form perineal area   Musculoskeletal:  Negative for back pain.   Skin:  Negative for rash.   Neurological:  Negative for weakness.   Hematological:  Does not bruise/bleed easily.       Physical Exam     Initial Vitals [02/23/24 1450]   BP Pulse Resp Temp SpO2   (!) 154/83 82 18 98.1 °F (36.7 °C) 100 %      MAP       --         Physical Exam    Nursing note and vitals reviewed.  Constitutional: She appears well-developed and well-nourished. No distress.   HENT:   Head: Normocephalic and atraumatic.   Neck: Neck supple.   Cardiovascular:  Normal rate, regular rhythm and normal heart sounds.           No murmur heard.  Pulmonary/Chest: Breath sounds normal. No respiratory distress. She has no wheezes. She has no rhonchi.   Abdominal: Abdomen is soft. Bowel sounds are normal. She exhibits no distension. There is no abdominal tenderness. There is no rebound and no guarding.   Genitourinary:    Genitourinary Comments: Patient has area of swelling in the R vulva, consistent with enlarged bartholin cyst.There is some mild ulceration of the mucosa with some overlying blood clot. No active bleeding.         Musculoskeletal:         General: No edema. Normal range of motion.      Cervical back: Neck supple.     Neurological: She is alert and oriented to person, place, and time.   Skin: Skin is warm and dry.   Psychiatric: She has a normal mood and affect. Thought content normal.         ED Course   I & D - Incision and Drainage    Date/Time: 2/23/2024 5:15 PM  Location procedure was performed: [facility] EMERGENCY DEPARTMENT    Performed by: Lisset Gutierrez MD  Authorized by: Lisset Gutierrez MD  Pre-operative diagnosis: bartholin abscess  Post-operative diagnosis: hematoma  Type: abscess  Body area: anogenital  Location details: Bartholin's gland  Anesthesia: local infiltration    Anesthesia:  Local Anesthetic: lidocaine 1% without epinephrine  Scalpel size: 11  Incision type: single straight  Incision  depth: submucosal  Complexity: complex  Drainage: bloody (with clots)  Wound treatment: deloculation, expression of material and clot removal (unable to fully remove clot with suction, expression, or deloculation or irrigation)    Incision depth: submucosal        Labs Reviewed   WOUND CULTURE (OLG) - Abnormal; Notable for the following components:       Result Value    Wound Culture Few Yeast (*)     Wound Culture Few Gram-negative Rods (*)     All other components within normal limits   COMPREHENSIVE METABOLIC PANEL - Abnormal; Notable for the following components:    Chloride 109 (*)     Carbon Dioxide 20 (*)     Glucose Level 126 (*)     Creatinine 1.11 (*)     Albumin Level 3.3 (*)     All other components within normal limits   CBC WITH DIFFERENTIAL - Abnormal; Notable for the following components:    MPV 11.5 (*)     All other components within normal limits   CBC W/ AUTO DIFFERENTIAL    Narrative:     The following orders were created for panel order CBC auto differential.  Procedure                               Abnormality         Status                     ---------                               -----------         ------                     CBC with Differential[[id number]]       Abnormal            Final result                 Please view results for these tests on the individual orders.          Imaging Results              CT Pelvis With IV Contrast NO Oral Contrast (Final result)  Result time 02/23/24 17:21:46      Final result by Jacklyn Lewis MD (02/23/24 17:21:46)                   Impression:      Well-defined 6.2 cm subcutaneous rounded dense lesion in the right perineum/upper thigh.  This may represent a hematoma.  Continued clinical follow-up is needed to ensure resolution.      Electronically signed by: Jacklyn Lewis  Date:    02/23/2024  Time:    17:21               Narrative:    EXAMINATION:  CT PELVIS WITH IV CONTRAST    CLINICAL HISTORY:  vulvular mass, appears to be hematoma on  exam;    TECHNIQUE:  CT imaging was performed of the pelvis after the administration of intravenous contrast. Dose length product is 679 mGycm. Automatic exposure control, adjustment of mA/kV or iterative reconstruction technique was used to limit radiation dose.    COMPARISON:  None    FINDINGS:  A well-defined, rounded dense lesion in the subcutaneous soft tissues of the right perineum and upper thigh measures 3.5 x 6.2 x 6.1 cm in size (series 2, image 54).  The uterus and bladder are unremarkable.  There is sigmoid diverticulosis.  There are moderate scattered vascular calcifications.  Degenerative changes are noted in the lumbar spine.                                       Medications   LIDOcaine HCL 10 mg/ml (1%) injection 100 mg (100 mg Intradermal Given 2/23/24 1519)   iopamidoL (ISOVUE-300) injection 100 mL (100 mLs Intravenous Given 2/23/24 1645)     Medical Decision Making  After attempted I and D patient was examined several times with only minimal oozing occurring from incision site. After wound packed, re-examined and no active bleeding    Amount and/or Complexity of Data Reviewed  Labs: ordered.  Radiology: ordered.    Risk  Prescription drug management.               ED Course as of 02/25/24 0940   Fri Feb 23, 2024 1658 Bedside US was consistent with complex fluid collection without active blood flow using color flow. I and D at bedside showed just large blood clots. No definitive infection infection. No purulence. Culture for further eval.  CT ordered to eval for mass or what might have caused the bleeding in the bartholin cyst. No sign of infection on blood work or examination. Patient does not have a gynecologist.  [GM]   1711 On recheck, bleeding controlled in area. [GM]   1731 Paging gynecology to discuss  [GM]   1753 Spoke with on call physician. Did not recommened closing up. Did recommened clindamycin, packing, remove packing in 2 days, and follow up with gynecology early next week [GM]    1811 Wound packed. Results were reviewed with patient. Questions were answered along with a discussion of signs and symptoms to return for. Patient comfortable with plan of discharge.   [GM]      ED Course User Index  [GM] Lisset Gutierrez MD                             Clinical Impression:  Final diagnoses:  [T14.8XXA] Hematoma (Primary)  [N75.0] Bartholin cyst          ED Disposition Condition    Discharge Stable          ED Prescriptions       Medication Sig Dispense Start Date End Date Auth. Provider    clindamycin (CLEOCIN) 300 MG capsule  (Status: Discontinued) Take 1 capsule (300 mg total) by mouth 3 (three) times daily. for 10 days 30 capsule 2/23/2024 2/23/2024 Lisset Gutierrez MD    clindamycin (CLEOCIN) 300 MG capsule  (Status: Discontinued) Take 1 capsule (300 mg total) by mouth 3 (three) times daily. for 10 days 30 capsule 2/23/2024 2/23/2024 Lisset Gutierrez MD    HYDROcodone-acetaminophen (NORCO) 5-325 mg per tablet Take 1 tablet by mouth every 8 (eight) hours as needed for Pain. 8 tablet 2/23/2024 -- Lisset Gutierrez MD    clindamycin (CLEOCIN) 300 MG capsule Take 1 capsule (300 mg total) by mouth 3 (three) times daily. for 10 days 30 capsule 2/23/2024 3/4/2024 Lisset Gutierrez MD          Follow-up Information       Follow up With Specialties Details Why Contact Info    Clinics, [facility]   A referral was sent to [facility] for Gynecology. They should call to schedule your follow up but we recommned you call at your earliest convience to check on the status., If symptoms worsen, return to the ED [address] [facility] [address] [phone]               Lisset Gutierrez MD  02/25/24 8030

## 2024-02-26 ENCOUNTER — PATIENT OUTREACH (OUTPATIENT)
Dept: EMERGENCY MEDICINE | Facility: HOSPITAL | Age: 79
End: 2024-02-26
Payer: MEDICARE

## 2024-02-26 LAB
BACTERIA WND CULT: ABNORMAL
BACTERIA WND CULT: ABNORMAL

## 2024-02-26 NOTE — PROGRESS NOTES
Patient discharged from the ED on 2/23/24 for hematoma. Attempted to reach patient to assist in scheduling a post ED 7-day follow up from the referral for Gynecology and with PCP. I left a voicemail and will try again later.

## 2024-02-26 NOTE — PROGRESS NOTES
Patient returned call.Patient declined assistance making a follow-up appointment with Dr Saleh PCP at this time.  Patient stated she would reach out to [facility] to insure they take her insurance for the referral placed by the ED.

## 2024-02-27 DIAGNOSIS — M10.9 GOUT, UNSPECIFIED CAUSE, UNSPECIFIED CHRONICITY, UNSPECIFIED SITE: ICD-10-CM

## 2024-02-27 RX ORDER — ALLOPURINOL 100 MG/1
TABLET ORAL
Qty: 90 TABLET | Refills: 3 | Status: SHIPPED | OUTPATIENT
Start: 2024-02-27

## 2024-02-29 ENCOUNTER — TELEPHONE (OUTPATIENT)
Dept: INTERNAL MEDICINE | Facility: CLINIC | Age: 79
End: 2024-02-29
Payer: MEDICARE

## 2024-02-29 DIAGNOSIS — N75.0 BARTHOLIN CYST: Primary | ICD-10-CM

## 2024-02-29 DIAGNOSIS — T14.8XXA HEMATOMA: ICD-10-CM

## 2024-02-29 NOTE — TELEPHONE ENCOUNTER
I spoke to patient regarding spouse's visit regarding her recent visit to ER. She requested referral for Gyn. Collab with Dr. Saleh. Can you please refer her to Dr. ELÍAS Shankar for dx of Bartholin's cyst/hematoma. Thanks.

## 2024-03-04 DIAGNOSIS — T14.8XXA HEMATOMA: ICD-10-CM

## 2024-03-04 DIAGNOSIS — N75.0 BARTHOLIN CYST: Primary | ICD-10-CM

## 2024-04-17 LAB
LEFT EYE DM RETINOPATHY: NEGATIVE
RIGHT EYE DM RETINOPATHY: NEGATIVE

## 2024-05-23 DIAGNOSIS — E55.9 VITAMIN D DEFICIENCY: ICD-10-CM

## 2024-05-23 RX ORDER — ERGOCALCIFEROL 1.25 MG/1
CAPSULE ORAL
Qty: 12 CAPSULE | Refills: 3 | Status: SHIPPED | OUTPATIENT
Start: 2024-05-23

## 2024-05-24 ENCOUNTER — TELEPHONE (OUTPATIENT)
Dept: INTERNAL MEDICINE | Facility: CLINIC | Age: 79
End: 2024-05-24
Payer: MEDICARE

## 2024-05-24 NOTE — TELEPHONE ENCOUNTER
----- Message from Flory Keane LPN sent at 5/21/2024  3:09 PM CDT -----  Regarding: THOMAS Saleh 5/31/24 @0900  Are there any outstanding tasks in the chart? Pt will need fasting labs- make appt her wellness    Is there any documentation of tasks? no    Please tell patient to bring living will, power of , or advance directive document to visit if they have it.

## 2024-05-28 ENCOUNTER — LAB VISIT (OUTPATIENT)
Dept: LAB | Facility: HOSPITAL | Age: 79
End: 2024-05-28
Attending: INTERNAL MEDICINE
Payer: MEDICARE

## 2024-05-28 DIAGNOSIS — E78.5 HYPERLIPIDEMIA, UNSPECIFIED HYPERLIPIDEMIA TYPE: ICD-10-CM

## 2024-05-28 DIAGNOSIS — E11.9 TYPE 2 DIABETES MELLITUS WITHOUT COMPLICATION, WITHOUT LONG-TERM CURRENT USE OF INSULIN: ICD-10-CM

## 2024-05-28 DIAGNOSIS — I10 PRIMARY HYPERTENSION: ICD-10-CM

## 2024-05-28 DIAGNOSIS — M10.9 GOUT, UNSPECIFIED CAUSE, UNSPECIFIED CHRONICITY, UNSPECIFIED SITE: ICD-10-CM

## 2024-05-28 DIAGNOSIS — E55.9 VITAMIN D DEFICIENCY: ICD-10-CM

## 2024-05-28 LAB
25(OH)D3+25(OH)D2 SERPL-MCNC: 33 NG/ML (ref 30–80)
ALBUMIN SERPL-MCNC: 3.7 G/DL (ref 3.4–4.8)
ALBUMIN/GLOB SERPL: 1.4 RATIO (ref 1.1–2)
ALP SERPL-CCNC: 73 UNIT/L (ref 40–150)
ALT SERPL-CCNC: 8 UNIT/L (ref 0–55)
ANION GAP SERPL CALC-SCNC: 9 MEQ/L
AST SERPL-CCNC: 11 UNIT/L (ref 5–34)
BASOPHILS # BLD AUTO: 0.07 X10(3)/MCL
BASOPHILS NFR BLD AUTO: 0.8 %
BILIRUB SERPL-MCNC: 0.6 MG/DL
BUN SERPL-MCNC: 19.2 MG/DL (ref 9.8–20.1)
CALCIUM SERPL-MCNC: 9.1 MG/DL (ref 8.4–10.2)
CHLORIDE SERPL-SCNC: 106 MMOL/L (ref 98–107)
CHOLEST SERPL-MCNC: 110 MG/DL
CHOLEST/HDLC SERPL: 3 {RATIO} (ref 0–5)
CO2 SERPL-SCNC: 24 MMOL/L (ref 23–31)
CREAT SERPL-MCNC: 1.12 MG/DL (ref 0.55–1.02)
CREAT UR-MCNC: 96.8 MG/DL (ref 45–106)
CREAT/UREA NIT SERPL: 17
EOSINOPHIL # BLD AUTO: 0.17 X10(3)/MCL (ref 0–0.9)
EOSINOPHIL NFR BLD AUTO: 1.9 %
ERYTHROCYTE [DISTWIDTH] IN BLOOD BY AUTOMATED COUNT: 14.9 % (ref 11.5–17)
EST. AVERAGE GLUCOSE BLD GHB EST-MCNC: 125.5 MG/DL
GFR SERPLBLD CREATININE-BSD FMLA CKD-EPI: 51 ML/MIN/1.73/M2
GLOBULIN SER-MCNC: 2.6 GM/DL (ref 2.4–3.5)
GLUCOSE SERPL-MCNC: 106 MG/DL (ref 82–115)
HBA1C MFR BLD: 6 %
HCT VFR BLD AUTO: 42.2 % (ref 37–47)
HDLC SERPL-MCNC: 39 MG/DL (ref 35–60)
HGB BLD-MCNC: 13.7 G/DL (ref 12–16)
IMM GRANULOCYTES # BLD AUTO: 0.04 X10(3)/MCL (ref 0–0.04)
IMM GRANULOCYTES NFR BLD AUTO: 0.5 %
LDLC SERPL CALC-MCNC: 47 MG/DL (ref 50–140)
LYMPHOCYTES # BLD AUTO: 1.84 X10(3)/MCL (ref 0.6–4.6)
LYMPHOCYTES NFR BLD AUTO: 20.8 %
MCH RBC QN AUTO: 29.2 PG (ref 27–31)
MCHC RBC AUTO-ENTMCNC: 32.5 G/DL (ref 33–36)
MCV RBC AUTO: 90 FL (ref 80–94)
MICROALBUMIN UR-MCNC: 6.9 UG/ML
MICROALBUMIN/CREAT RATIO PNL UR: 7.1 MG/GM CR (ref 0–30)
MONOCYTES # BLD AUTO: 0.44 X10(3)/MCL (ref 0.1–1.3)
MONOCYTES NFR BLD AUTO: 5 %
NEUTROPHILS # BLD AUTO: 6.28 X10(3)/MCL (ref 2.1–9.2)
NEUTROPHILS NFR BLD AUTO: 71 %
NRBC BLD AUTO-RTO: 0 %
PLATELET # BLD AUTO: 226 X10(3)/MCL (ref 130–400)
PMV BLD AUTO: 10.7 FL (ref 7.4–10.4)
POTASSIUM SERPL-SCNC: 4.3 MMOL/L (ref 3.5–5.1)
PROT SERPL-MCNC: 6.3 GM/DL (ref 5.8–7.6)
RBC # BLD AUTO: 4.69 X10(6)/MCL (ref 4.2–5.4)
SODIUM SERPL-SCNC: 139 MMOL/L (ref 136–145)
TRIGL SERPL-MCNC: 118 MG/DL (ref 37–140)
URATE SERPL-MCNC: 6.2 MG/DL (ref 2.6–6)
VLDLC SERPL CALC-MCNC: 24 MG/DL
WBC # SPEC AUTO: 8.84 X10(3)/MCL (ref 4.5–11.5)

## 2024-05-28 PROCEDURE — 84550 ASSAY OF BLOOD/URIC ACID: CPT

## 2024-05-28 PROCEDURE — 36415 COLL VENOUS BLD VENIPUNCTURE: CPT

## 2024-05-28 PROCEDURE — 80061 LIPID PANEL: CPT

## 2024-05-28 PROCEDURE — 82043 UR ALBUMIN QUANTITATIVE: CPT

## 2024-05-28 PROCEDURE — 83036 HEMOGLOBIN GLYCOSYLATED A1C: CPT

## 2024-05-28 PROCEDURE — 85025 COMPLETE CBC W/AUTO DIFF WBC: CPT

## 2024-05-28 PROCEDURE — 80053 COMPREHEN METABOLIC PANEL: CPT

## 2024-05-28 PROCEDURE — 82306 VITAMIN D 25 HYDROXY: CPT

## 2024-05-31 ENCOUNTER — OFFICE VISIT (OUTPATIENT)
Dept: INTERNAL MEDICINE | Facility: CLINIC | Age: 79
End: 2024-05-31
Payer: MEDICARE

## 2024-05-31 VITALS
RESPIRATION RATE: 18 BRPM | HEART RATE: 77 BPM | OXYGEN SATURATION: 99 % | DIASTOLIC BLOOD PRESSURE: 74 MMHG | BODY MASS INDEX: 40.46 KG/M2 | WEIGHT: 237 LBS | HEIGHT: 64 IN | SYSTOLIC BLOOD PRESSURE: 116 MMHG

## 2024-05-31 DIAGNOSIS — I10 PRIMARY HYPERTENSION: ICD-10-CM

## 2024-05-31 DIAGNOSIS — M10.9 GOUT, UNSPECIFIED CAUSE, UNSPECIFIED CHRONICITY, UNSPECIFIED SITE: ICD-10-CM

## 2024-05-31 DIAGNOSIS — E66.01 MORBID OBESITY: ICD-10-CM

## 2024-05-31 DIAGNOSIS — M85.80 OSTEOPENIA, UNSPECIFIED LOCATION: ICD-10-CM

## 2024-05-31 DIAGNOSIS — E11.9 TYPE 2 DIABETES MELLITUS WITHOUT COMPLICATION, WITHOUT LONG-TERM CURRENT USE OF INSULIN: ICD-10-CM

## 2024-05-31 DIAGNOSIS — Z72.3 LACK OF PHYSICAL ACTIVITY: ICD-10-CM

## 2024-05-31 DIAGNOSIS — E78.5 HYPERLIPIDEMIA, UNSPECIFIED HYPERLIPIDEMIA TYPE: ICD-10-CM

## 2024-05-31 DIAGNOSIS — Z12.31 OTHER SCREENING MAMMOGRAM: Primary | ICD-10-CM

## 2024-05-31 DIAGNOSIS — E55.9 VITAMIN D DEFICIENCY: ICD-10-CM

## 2024-05-31 DIAGNOSIS — Z00.00 ENCOUNTER FOR MEDICARE ANNUAL WELLNESS EXAM: ICD-10-CM

## 2024-05-31 DIAGNOSIS — N18.31 CHRONIC KIDNEY DISEASE, STAGE 3A: ICD-10-CM

## 2024-05-31 DIAGNOSIS — F32.A DEPRESSION, UNSPECIFIED DEPRESSION TYPE: ICD-10-CM

## 2024-05-31 DIAGNOSIS — I48.91 ATRIAL FIBRILLATION, UNSPECIFIED TYPE: ICD-10-CM

## 2024-05-31 PROCEDURE — G0439 PPPS, SUBSEQ VISIT: HCPCS | Mod: ,,, | Performed by: INTERNAL MEDICINE

## 2024-05-31 NOTE — PATIENT INSTRUCTIONS
Medicare Annual Wellness Visit      Patient Name: Jamilah Malone  Today's Date: 5/31/2024    Below you will find your 5-10 year Screening Plan Recommendations:  Health Maintenance       Date Due Completion Date    TETANUS VACCINE Never done ---    Shingles Vaccine (1 of 2) Never done ---    RSV Vaccine (Age 60+ and Pregnant patients) (1 - 1-dose 60+ series) Never done ---    Pneumococcal Vaccines (Age 65+) (3 of 3 - PPSV23 or PCV20) 01/01/2017 10/7/2015    COVID-19 Vaccine (3 - 2023-24 season) 09/01/2023 6/17/2021    Eye Exam 04/12/2024 4/12/2023    DEXA Scan 09/20/2024 9/20/2022    Hemoglobin A1c 11/28/2024 5/28/2024    Diabetes Urine Screening 05/28/2025 5/28/2024    Lipid Panel 05/28/2025 5/28/2024          Below is your summarized Personalized Prevention Plan that addresses any concerns we discussed today at your visit. Please see attached detailed information specific to your Health Concerns.  Orders Placed This Encounter   Procedures    Mammo Digital Screening Bilat w/ Brett    Basic Metabolic Panel         The following information is provided to all patients.  This information is to help you find additional resources for any problems that may be affecting your health: Living healthy guide: www.[facility].[place].gov      Understanding Diabetes: www.diabetes.org      Eating healthy: www.cdc.gov/healthyweight      CDC home safety checklist: www.cdc.gov/steadi/patient.html      Agency on Aging: www.goea.[place].gov      Alcoholics anonymous (AA): www.aa.org      Physical Activity: www.[place]un.nih.gov/uh0ghpr      Tobacco use: www.quitwithusla.org       Patient Education       Weight Loss Tips   About this topic   More and more people are concerned about their weight. You can choose from many different programs. The goal of a weight loss program may be to cut down on calories or to lose extra weight through exercise.  Losing weight may mean changing your ideas about food. Going on a diet and losing weight does not mean  "starving yourself. It means cutting down on the amount of food you eat, making healthy food choices, and being active.  General   Ideally, you need to lose 1 to 2 pounds (0.5 to 1 kg) a week for a healthy weight loss. Losing too much weight too fast is not good. When you take in fewer calories, you will lose weight. Your ideal calorie and weight goal depends on your current age, weight, height, and personal goals. Ask your doctor or dietitian what your ideal weight is.  You need to burn 3500 calories to lose 1 pound (0.5 kg). That means cutting out 500 calories every day for 7 days. You can cut out 500 calories per day by eating or drinking fewer calories, burning them through exercise, or doing both. To lose that extra weight and stay healthy:  Take time to exercise.  Exercise regularly. Burn calories with activity and exercise. Exercise can help you lose weight and it also strengthens your muscles. Set a schedule where you will have time to do exercises. With just 30 to 60 minutes of exercise each day, you could burn 500 extra calories. Your metabolism stays elevated for a period of time after exercise.  If you don't have time for a 30 minute workout, try three 10 minute exercises each day.  If you work near your home, walk to work. Walking is a very good form of exercise.  Take a 20 minute walk each day. Walk during your lunch break. Park far away, so you have to walk more.  Take the steps instead of elevators. You will burn more calories this way.  If you have an illness, like diabetes or high blood pressure, ask your doctor how much exercise is right for you.  Choose healthy snacks.  Low calorie healthy snacks are a good thing. They help your blood sugar stay even and prevent you from overeating at meals. Choose a balanced snack, such as a small apple with 2 tablespoons (30 grams) of peanut butter.  Keep in mind, even "low calorie" foods can add up. Just because you choose low calorie foods does not mean you do " not have to count the calories you eat.  Pack a few fresh fruits or a small salad to take to work or school. Avoid buying a snack at the nearest vending machine.  When you feel thirsty, drink water. Water has no calories and is a very good thirst quencher.  Plan healthy meals.  Plan ahead. Keep a diary of foods that are low in calories. You can also make a list of meal plans for your breakfast, lunch, and dinner. Planning ahead will prevent you from eating out at a fast food place or restaurant.  Make a grocery list before shopping so you only buy food you need. Don't go to the store hungry.  Visit a dietitian. This person will help you make meal plans that will help you lose weight.  Add fiber to your meals. Adding fiber helps you to feel full for a longer amount of time.  Take care when eating out.  Choose lower fat and lower calorie meals. Try a seafood, lean meat, or vegetarian entrée.  Share a meal with a friend.  Try a salad and appetizer instead of an entree.  Ask for a to-go box when dinner is served and put half of your meal in it for a later meal.  Have fruit for dessert.  Drink water instead of other high calorie drinks.  Learn not to overeat.  Watch your portions. For example, the recommended serving size of meat is 3 ounces (90 grams). This is the size of a deck of playing cards. Two tablespoons (30 grams) of peanut butter is the size of a ping-pong ball. One medium fruit is the size of a baseball.  Use a smaller plate or glass during dinner for less calorie intake.  Try drinking a glass or two of water before eating. This may make you feel more full and help you to eat less.  Eat slowly. Take at least 30 minutes to eat. This gives time for your brain to tell your stomach you are full. This will help you avoid overeating.  Some people eat smaller meals more often to help not to overeat. If you can eat six small meals, make them healthy and low calorie. If three meals are best for you, know your calorie  level for the day and spread it out into three healthy low calorie meals.     What will the results be?   Losing weight may make you healthier. You also may have more energy for your daily activities. You may lower disease risk. You may also add years to your life.  What changes to diet are needed?   Learn how to read nutrition labels. Know the serving size. Knowing the calories in an item will help you make healthier choices and lose weight.  Keep a diary of the food you eat. This will help you count the calories you are taking in.  Make a menu in advance. This will help you make good choices to include in your diet.  Avoid eating 2 hours before bedtime to allow for digestion. If you eat right before you go to bed, you may also have worse heartburn.  Be sure to count the calories in the things you drink. You may want to stop drinking soda pop, beer, wine, and mixed drinks (alcohol). Some coffee drinks also have a lot of calories in them.  Who should use this diet?   A weight loss diet may be needed for people with a calculated body mass index of 25 and over. This means you are overweight or obese.  Who should not use this diet?   People with BMI of 18.5 or lower should not use this diet. Do not use this diet if your doctor does not recommend weight loss.  What foods are good to eat?   Choose foods that are nutritious. Remember, portion control is key. Even a low calorie food can become high in calories if you have too big of a serving. Here is a list of foods that are good to eat:  Vegetables:   Broccoli  Asparagus  Spinach  Green leafy vegetables  Tomatoes  Onions  Mushrooms  Cucumbers  Zucchini  Lean proteins:   Egg whites  Beans including kidney, navy, black, and chickpeas  Grilled, broiled, or baked skinless chicken breast  Grilled, broiled, or baked skinless turkey breast  Lean beef  [place] meat  Grilled, broiled, or baked fish  Beans  Nuts, such as almonds, cashews, and pistachios  Seeds  Whole grains and  carbs:   Oatmeal  Brown rice  Sweet potatoes  Cereal  Whole grain bread or pasta  Fruits:   Apples  Grapefruit  Blueberries  Oranges  Bananas  Grapes  Peaches  Pineapple  Strawberries  Dairy:   Fat-free or low-fat milk and cheese  Low fat yogurt  Soy, rice, or almond milk  What foods should be limited or avoided?   Limit or avoid foods that are high in calories like:  Junk foods  Fried foods  Fatty foods  Processed meats  Food with saturated and trans fat  Whole fat dairy products  Butter  Cheese  Ice cream  Food and drinks with a lot of sugar. Some examples are beer, wine, mixed drinks (alcohol), carbonated sodas, cakes, and cookies.  When do I need to call the doctor?   Weakness  Fast heartbeat  Dizziness  Helpful tips   Join a support group and an exercise group. It is much easier to lose weight if you have support and encouragement.  Do not skip meals. If you skip a meal, you most likely will overeat at that next meal.  Eat at the dining room table instead in front of the TV to help monitor intake.  Where can I learn more?   Academy of Nutrition and Dietetics  https://www.eatright.org/health/weight-loss/your-health-and-your-weight/back-to-basics-for-healthy-weight-loss   Centers for Disease Control and Prevention  https://www.cdc.gov/healthyweight/   Weight-Control Information Network  https://www.niddk.nih.gov/health-information/diet-nutrition/changing-habits-better-health   Last Reviewed Date   2021-08-09  Consumer Information Use and Disclaimer   This information is not specific medical advice and does not replace information you receive from your health care provider. This is only a brief summary of general information. It does NOT include all information about conditions, illnesses, injuries, tests, procedures, treatments, therapies, discharge instructions or life-style choices that may apply to you. You must talk with your health care provider for complete information about your health and treatment  options. This information should not be used to decide whether or not to accept your health care providers advice, instructions or recommendations. Only your health care provider has the knowledge and training to provide advice that is right for you.  Copyright   Copyright © 2021 UpToDate, Inc. and its affiliates and/or licensors. All rights reserved.    Patient Education       Health Risks of a High BMI   About this topic   Your weight and health depend on a few things. Doctors use a method called BMI or body mass index as a tool to learn more about your risk of having health problems. This tool uses your weight and your height to find your BMI.  BMI does not include things like your habits, where you live, family history, or amount of body fat. Some people with a normal BMI are still not healthy. Other people with a high BMI may be healthy. Most of the time, a person with a higher BMI is less healthy and will need more care. Ask your doctor for their view of your total health during a well visit or physical.  Being overweight or having a high BMI can hurt many parts of your body. Learn about your BMI and how your weight changes your health risks. Then you can make changes to keep yourself as healthy as you can.  General   Weighing too much can be very harmful to your body. It can cause many illnesses and can make it hard to move about.  Blood Sugar   You have a greater chance of having high blood sugar or diabetes if you weigh too much. Your body normally makes a hormone called insulin. The insulin allows your body to use the sugar in your blood.  The cells in your body may not be able to use insulin. Then your cells cannot get the sugar from your bloodstream that they need for energy. Your pancreas has to work extra hard to try and make enough insulin to keep your blood sugar healthy.  If you lose weight and exercise, your body is able to control your blood sugar levels better. You may not need as much insulin  to keep your blood sugar levels healthy.  Your Heart   Being overweight makes your heart work harder.  The blood vessels that bring blood to your heart muscle may become narrow or blocked and cause a heart attack or your heart may not pump as well as it should. Your heart rate may not be normal.  Losing weight can lower your chances of having problems with your heart.  High Blood Pressure   Your heart has to work harder to pump blood through a larger body and to make sure all of your cells have the oxygen they need.  As your heart has to work harder, your blood pressure goes up.  Being overweight can also harm your kidneys and this may also raise your blood pressure.  You may be able to lower your blood pressure through weight loss and routine exercise.  Stroke   Strokes are more likely to happen when someone has high blood pressure, heart problems, high blood sugar, or high cholesterol.  Being overweight puts you at a higher risk for all of these health problems. These problems put you at a higher risk for having a stroke.  Losing weight may help lower your blood pressure, which is the biggest risk factor for a stroke.  Cholesterol   Your cholesterol level is likely to be higher if you are overweight.  This can lead to narrowing of the blood vessels in your heart, neck, or other parts of your body. Then you may have chest pain or signs of low blood flow to a certain area. It can also lead to a heart attack or stroke.  Lowering your weight and changing what you eat may change your cholesterol levels.  Your Liver and Kidneys   You are more likely to have certain problems with your liver or kidneys if you have a high BMI.  Fatty liver disease is caused by a buildup of fat in your liver. Then your liver may not work as well as it should.  You are at a higher risk for diabetes if you are overweight. This illness can cause kidney problems.  There is no exact way to treat fatty liver disease. By losing weight, you may  keep your liver from getting any worse and help your liver work better.  By losing weight, you lower your chance of having kidney problems. If you already have kidney problems, weight loss may help keep your disease from getting worse.  Bone and Joint Problems   Weighing too much can put a lot of stress on your joints.  The extra weight may make the cartilage wear away more quickly from your bones. Your cartilage lines the surfaces of your bones to help your joints glide more easily.  When your cartilage is worn, your joints become stiff and sore.  Losing weight and exercising is one of the best ways to treat joint pain and stiffness. It can also ease the stress on your hips, knees, and back.  Cancer Risk   Gaining weight and poor health habits raise your risk for some kinds of cancer.  Healthy eating and exercise may lower your risk for some kinds of cancer.  Sleep   With a high BMI, you may have extra fat around your neck. This can make your airway smaller and put you at risk for a problem called sleep apnea. With this problem, your breathing starts and stops when you are sleeping.  Signs of sleep apnea include snoring, feeling sleepy during the day, and problems with focusing.  Sleep apnea can lead to heart problems such as increased risk for heart disease and arrhythmias like atrial fibrillation.  Losing weight can lower the amount of fat around your neck and ease sleep apnea problems.  Pregnancy   Your weight can affect how often you have a period. It may also make it harder for you to get pregnant. A high BMI can cause problems for both mom and baby.  If you are overweight and pregnant you are at a higher risk for high blood sugar or high blood pressure while you are pregnant.  You are also more likely to have your baby early or to need a C section.  Losing weight before you become pregnant may lower your chance for these problems. If you are pregnant, talk to your doctor before you try to lose  weight.  Depression   You are more likely to suffer from depression or low mood when you have a high BMI.  You may have a low mood because of low self-esteem, lack of activity, lack of sleep, and health problems caused by being overweight.  Losing weight and finding out the reasons you overeat are some of the steps to improve your quality of life and deal with depression.  Where can I learn more?   National [place] of Diabetes and Digestive and Kidney Diseases  https://www.niddk.nih.gov/health-information/weight-management/adult-overweight-obesity/health-risks   Last Reviewed Date   2021-09-15  Consumer Information Use and Disclaimer   This information is not specific medical advice and does not replace information you receive from your health care provider. This is only a brief summary of general information. It does NOT include all information about conditions, illnesses, injuries, tests, procedures, treatments, therapies, discharge instructions or life-style choices that may apply to you. You must talk with your health care provider for complete information about your health and treatment options. This information should not be used to decide whether or not to accept your health care providers advice, instructions or recommendations. Only your health care provider has the knowledge and training to provide advice that is right for you.  Copyright   Copyright © 2021 UpToDate, Inc. and its affiliates and/or licensors. All rights reserved.    Patient Education       Exercise and Aging   General   Exercise can help older adults prevent falls and stay independent longer. Exercise may also help:  You have stronger muscles and bones and better balance  You lose weight and have more energy  Make your heart and lungs stronger  Lower your chance of health problems like heart disease, high blood sugar, or breast or colon cancer  Control conditions like high blood pressure and diabetes  You manage stress and get better  sleep  Your mood, self-esteem, and self-image  How you think, plan, and pay attention  There are four types of exercise: endurance, strength, balance, and flexibility.  Endurance exercises get your heart rate up and keep it up for a while. Most people should get at least 30 minutes of exercise that gets your heart rate up on 5 or more days each week. Walking, swimming, biking, or going up and down stairs are kinds of exercises to get your heart rate up. You do not have to do all 30 minutes at one time. Try doing 10 minutes 3 times a day.  Strength exercises build muscle. Lifting weights or doing knee bends are kinds of strength exercises.  Balance exercises help to prevent falls. Raise up on your toes or stand on one leg as a kind of balance exercise.  Flexibility exercises move your joints through a full range of motion and stretch your muscles. Bend forward in a chair to stretch your back, do stretches, or bend and extend your arms or legs as a kind of flexibility exercise. Try doing 10 minutes twice a week.  Plan to include all these exercise types in your exercise program. Always check with your doctor before you start a new exercise program.  Some people do not like formal exercise classes, but there are many ways to work your muscles each day. Here are some things you can do.  Use steps instead of elevators.  Park far away in parking lots to walk farther.  Use the time while you wait. Do knee bends as you hold onto the kitchen counter while you wait for your coffee to brew.  When you unload groceries, lift the bags or a container of milk a few times to exercise your arms and legs.  Walk the long way when you go somewhere.  After you walk to the bathroom, walk a few laps around the house before sitting down.  Try doing different standing exercises after you wash your hands each time in the bathroom.  Do balance exercises while you brush your teeth.  Do an exercise or get up and walk during TV  commercials.  Don't forget to exercise small muscle groups like your hands, fingers, ankles, toes, and neck. Wiggle, bend, flex, and rotate these joints from left to right and back to front to help to keep these joints flexible.  When do I need to call the doctor?   Stop exercising and talk to your doctor if you have any of these problems:  Dizziness  Shortness of breath  Pain or pressure in the chest, arms, throat, jaw, or back  Nausea or vomiting  Blood clots  Infection  Joint swelling  Open wounds  Recent hip, back, or eye surgery  New problems that start during exercise  Helpful tips   If you have a health problem like heart disease or diabetes, talk with your doctor about the best exercises for you.  Always warm up before you stretch. Heated muscles stretch much easier than cool muscles. Try to walk or bike at an easy pace for a few minutes to warm up your muscles.  Slow your pace again after you exercise to cool down and bring your heart rate down slowly.  Be sure you do not hold your breath when you exercise because it can raise your blood pressure. If you tend to hold your breath, try to count out loud when you exercise.  Never bounce when doing stretches. Use slow and steady motions and hold your stretch for 20 to 30 seconds.  Have a routine. Doing exercises before a meal may be a good way to get into a routine.  Set small goals for yourself when you start to exercise. Use a chart to see how much you are doing. Ask someone to exercise with you.  Exercise may be slightly uncomfortable, but you should not have sharp pains. If you do get sharp pains, stop what you are doing. If the sharp pains continue, call your doctor.  Drink plenty of fluids without caffeine when you exercise and afterwards. Avoid outdoor exercise if it is too cold or too hot.  Wear the right clothes and shoes. Try layers of clothes, so that you can take them off if you get too hot. Shoes should fit well and support your feet.  Where can I  learn more?   National [place] on Aging  https://www.shaun.nih.gov/health/publication/exercise-physical-activity/introduction   Last Reviewed Date   2021-03-18  Consumer Information Use and Disclaimer   This information is not specific medical advice and does not replace information you receive from your health care provider. This is only a brief summary of general information. It does NOT include all information about conditions, illnesses, injuries, tests, procedures, treatments, therapies, discharge instructions or life-style choices that may apply to you. You must talk with your health care provider for complete information about your health and treatment options. This information should not be used to decide whether or not to accept your health care providers advice, instructions or recommendations. Only your health care provider has the knowledge and training to provide advice that is right for you.  Copyright   Copyright © 2021 UpToDate, Inc. and its affiliates and/or licensors. All rights reserved.

## 2024-05-31 NOTE — ASSESSMENT & PLAN NOTE
Health Maintenance Due   Topic Date Due    TETANUS VACCINE  Never done    Shingles Vaccine (1 of 2) Never done    RSV Vaccine (Age 60+ and Pregnant patients) (1 - 1-dose 60+ series) Never done    Pneumococcal Vaccines (Age 65+) (3 of 3 - PPSV23 or PCV20) 01/01/2017    COVID-19 Vaccine (3 - 2023-24 season) 09/01/2023    Eye Exam  04/12/2024     Recommended vaccines discussed.  Recent labs reviewed and discussed.

## 2024-06-06 ENCOUNTER — DOCUMENTATION ONLY (OUTPATIENT)
Dept: INTERNAL MEDICINE | Facility: CLINIC | Age: 79
End: 2024-06-06
Payer: MEDICARE

## 2024-06-13 ENCOUNTER — HOSPITAL ENCOUNTER (OUTPATIENT)
Dept: RADIOLOGY | Facility: HOSPITAL | Age: 79
Discharge: HOME | End: 2024-06-13
Attending: INTERNAL MEDICINE
Payer: MEDICARE

## 2024-06-13 DIAGNOSIS — Z12.31 OTHER SCREENING MAMMOGRAM: ICD-10-CM

## 2024-06-13 PROCEDURE — 77067 SCR MAMMO BI INCL CAD: CPT | Mod: 26,,, | Performed by: RADIOLOGY

## 2024-06-13 PROCEDURE — 77063 BREAST TOMOSYNTHESIS BI: CPT | Mod: 26,,, | Performed by: RADIOLOGY

## 2024-08-19 DIAGNOSIS — E11.9 TYPE 2 DIABETES MELLITUS WITHOUT COMPLICATION, WITHOUT LONG-TERM CURRENT USE OF INSULIN: ICD-10-CM

## 2024-08-19 RX ORDER — METFORMIN HYDROCHLORIDE 500 MG/1
TABLET ORAL
Qty: 180 TABLET | Refills: 6 | Status: SHIPPED | OUTPATIENT
Start: 2024-08-19

## 2024-08-28 DIAGNOSIS — F32.A DEPRESSION, UNSPECIFIED DEPRESSION TYPE: ICD-10-CM

## 2024-08-28 RX ORDER — DULOXETIN HYDROCHLORIDE 60 MG/1
120 CAPSULE, DELAYED RELEASE ORAL
Qty: 270 CAPSULE | Refills: 3 | Status: SHIPPED | OUTPATIENT
Start: 2024-08-28

## 2024-09-02 PROBLEM — Z00.00 ENCOUNTER FOR MEDICARE ANNUAL WELLNESS EXAM: Status: RESOLVED | Noted: 2022-11-22 | Resolved: 2024-09-02

## 2024-09-30 ENCOUNTER — CLINICAL SUPPORT (OUTPATIENT)
Dept: INTERNAL MEDICINE | Facility: CLINIC | Age: 79
End: 2024-09-30
Payer: MEDICARE

## 2024-09-30 DIAGNOSIS — Z23 FLU VACCINE NEED: Primary | ICD-10-CM

## 2024-09-30 PROCEDURE — 90653 IIV ADJUVANT VACCINE IM: CPT | Mod: ,,, | Performed by: INTERNAL MEDICINE

## 2024-09-30 PROCEDURE — G0008 ADMIN INFLUENZA VIRUS VAC: HCPCS | Mod: ,,, | Performed by: INTERNAL MEDICINE

## 2024-11-26 DIAGNOSIS — E11.9 TYPE 2 DIABETES MELLITUS WITHOUT COMPLICATION, WITHOUT LONG-TERM CURRENT USE OF INSULIN: Primary | ICD-10-CM

## 2024-11-27 ENCOUNTER — TELEPHONE (OUTPATIENT)
Dept: INTERNAL MEDICINE | Facility: CLINIC | Age: 79
End: 2024-11-27
Payer: MEDICARE

## 2024-11-27 ENCOUNTER — LAB VISIT (OUTPATIENT)
Dept: LAB | Facility: HOSPITAL | Age: 79
End: 2024-11-27
Attending: INTERNAL MEDICINE
Payer: MEDICARE

## 2024-11-27 DIAGNOSIS — E11.9 TYPE 2 DIABETES MELLITUS WITHOUT COMPLICATION, WITHOUT LONG-TERM CURRENT USE OF INSULIN: ICD-10-CM

## 2024-11-27 DIAGNOSIS — I10 PRIMARY HYPERTENSION: ICD-10-CM

## 2024-11-27 LAB
ANION GAP SERPL CALC-SCNC: 7 MEQ/L
BUN SERPL-MCNC: 19 MG/DL (ref 9.8–20.1)
CALCIUM SERPL-MCNC: 9.6 MG/DL (ref 8.4–10.2)
CHLORIDE SERPL-SCNC: 108 MMOL/L (ref 98–107)
CO2 SERPL-SCNC: 26 MMOL/L (ref 23–31)
CREAT SERPL-MCNC: 1.09 MG/DL (ref 0.55–1.02)
CREAT/UREA NIT SERPL: 17
EST. AVERAGE GLUCOSE BLD GHB EST-MCNC: 119.8 MG/DL
GFR SERPLBLD CREATININE-BSD FMLA CKD-EPI: 52 ML/MIN/1.73/M2
GLUCOSE SERPL-MCNC: 109 MG/DL (ref 82–115)
HBA1C MFR BLD: 5.8 %
POTASSIUM SERPL-SCNC: 4.9 MMOL/L (ref 3.5–5.1)
SODIUM SERPL-SCNC: 141 MMOL/L (ref 136–145)

## 2024-11-27 PROCEDURE — 80048 BASIC METABOLIC PNL TOTAL CA: CPT

## 2024-11-27 PROCEDURE — 83036 HEMOGLOBIN GLYCOSYLATED A1C: CPT

## 2024-11-27 PROCEDURE — 36415 COLL VENOUS BLD VENIPUNCTURE: CPT

## 2024-11-27 NOTE — TELEPHONE ENCOUNTER
----- Message from Nurse Ruth sent at 11/26/2024  8:21 AM CST -----  Regarding: THOMAS Saleh 12/4/24 @1:20p  Are there any outstanding tasks in the chart? Patient will need nonfasting labs    Is there any documentation of tasks? no    Please tell patient to bring living will, power of , or advance directive document to visit if they have it.

## 2024-12-04 ENCOUNTER — OFFICE VISIT (OUTPATIENT)
Dept: INTERNAL MEDICINE | Facility: CLINIC | Age: 79
End: 2024-12-04
Payer: MEDICARE

## 2024-12-04 VITALS
BODY MASS INDEX: 42 KG/M2 | RESPIRATION RATE: 18 BRPM | SYSTOLIC BLOOD PRESSURE: 120 MMHG | HEART RATE: 71 BPM | OXYGEN SATURATION: 96 % | DIASTOLIC BLOOD PRESSURE: 60 MMHG | HEIGHT: 64 IN | WEIGHT: 246 LBS

## 2024-12-04 DIAGNOSIS — E11.9 TYPE 2 DIABETES MELLITUS WITHOUT COMPLICATION, WITHOUT LONG-TERM CURRENT USE OF INSULIN: ICD-10-CM

## 2024-12-04 DIAGNOSIS — N18.31 CHRONIC KIDNEY DISEASE, STAGE 3A: ICD-10-CM

## 2024-12-04 DIAGNOSIS — I10 PRIMARY HYPERTENSION: ICD-10-CM

## 2024-12-04 DIAGNOSIS — E78.5 HYPERLIPIDEMIA, UNSPECIFIED HYPERLIPIDEMIA TYPE: ICD-10-CM

## 2024-12-04 DIAGNOSIS — F32.A DEPRESSION, UNSPECIFIED DEPRESSION TYPE: Primary | ICD-10-CM

## 2024-12-04 PROCEDURE — 99214 OFFICE O/P EST MOD 30 MIN: CPT | Mod: ,,, | Performed by: INTERNAL MEDICINE

## 2024-12-04 RX ORDER — LISINOPRIL 10 MG/1
10 TABLET ORAL DAILY
Qty: 30 TABLET | Refills: 6 | Status: SHIPPED | OUTPATIENT
Start: 2024-12-04

## 2024-12-04 NOTE — PROGRESS NOTES
"Subjective:      Chief Complaint: Follow-up (6mo/Discuss labs )      HPI:She is here for f/u htn, dm, a fib, h/o gout, depression, ckd.  She c/o feeling light headed sometimes -- no vertigo.  She notices when she gets up to go to the bathroom or after eating.  She feels like she's been eating less, but she's gained weight.  She does have some cough and congestion.  She takes allegra but not daily.  Her CBG have been ok.  Her mood is doing ok.  Her gyn did an MRI recently showing "a polyp on her uterine lining".  He recommended it be removed.  She isn't having any vaginal bleeding.  She isn't ready to proceed with that.  So she will having another u/s in a month or two.  She checks her BP 2-3 times per week.  It is <120/60s.  She is trying to drink more water.  Problem Noted   Chronic Kidney Disease, Stage 3a 5/31/2024   Depression 7/20/2022   Gerd (Gastroesophageal Reflux Disease) 7/20/2022   Gout 7/20/2022   Hyperlipidemia 7/20/2022   Osteoarthritis 7/20/2022   Osteopenia 7/20/2022   Vitamin D Deficiency 7/20/2022   Morbid (Severe) Obesity Due to Excess Calories 7/20/2022   Atrial Fibrillation 7/16/2014    followed by Dr. Song at [facility]       Htn (Hypertension) 6/2/2014   Diabetes Mellitus 1/30/2014   Encounter for Medicare Annual Wellness Exam (Resolved) 11/22/2022   Kidney Stones (Resolved) 7/20/2022   Miscarriage (Resolved) 7/2/2013    Formatting of this note might be different from the original.  2 miscarriages          The patient's Health Maintenance was reviewed and the following appears to be due:   Health Maintenance Due   Topic Date Due    TETANUS VACCINE  Never done    Shingles Vaccine (1 of 2) Never done    Pneumococcal Vaccines (Age 65+) (3 of 3 - PPSV23 or PCV20) 01/01/2017    RSV Vaccine (Age 60+ and Pregnant patients) (1 - 1-dose 75+ series) Never done    COVID-19 Vaccine (3 - 2024-25 season) 09/01/2024    DEXA Scan  09/20/2024       Past Medical History:  Past Medical History:   Diagnosis Date    " "Anxiety disorder, unspecified     Arthritis     Depression     Diverticulosis of sigmoid colon 02/02/2022    COLONOSCOPY    GERD (gastroesophageal reflux disease)     Kidney stones 7/20/2022    Miscarriage 7/2/2013    Formatting of this note might be different from the original. 2 miscarriages    Personal history of colonic polyps 02/02/2022    COLONOSCOPY    Premature ventricular complex     Vertigo      Review of patient's allergies indicates:   Allergen Reactions    Cephalexin     Sulfa (sulfonamide antibiotics)      Current Outpatient Medications on File Prior to Visit   Medication Sig Dispense Refill    acetaminophen (TYLENOL) 650 MG TbSR Take 650 mg by mouth every 8 (eight) hours as needed.      allopurinoL (ZYLOPRIM) 100 MG tablet TAKE 1 TABLET BY MOUTH DAILY 90 tablet 3    atorvastatin (LIPITOR) 20 MG tablet TAKE 1 TABLET BY MOUTH ONCE DAILY 90 tablet 3    DULoxetine (CYMBALTA) 60 MG capsule TAKE 2 CAPSULES BY MOUTH EVERY  capsule 3    ELIQUIS 5 mg Tab Take 5 mg by mouth 2 (two) times daily.      ergocalciferol (ERGOCALCIFEROL) 50,000 unit Cap TAKE 1 CAPSULE BY MOUTH EVERY 7 DAYS 12 capsule 3    esomeprazole (NEXIUM) 40 MG capsule Take 40 mg by mouth before breakfast.      fexofenadine (ALLEGRA) 180 MG tablet Take 180 mg by mouth once daily.      metFORMIN (GLUCOPHAGE) 500 MG tablet TAKE 1 TABLET BY MOUTH TWICE DAILY 180 tablet 6    MULTAQ 400 mg Tab Take 1 tablet by mouth 2 (two) times daily.      [DISCONTINUED] lisinopriL (PRINIVIL,ZESTRIL) 20 MG tablet TAKE 1 TABLET BY MOUTH DAILY 90 tablet 3     No current facility-administered medications on file prior to visit.       Review of Systems   Respiratory:  Positive for cough (dry). Negative for shortness of breath.    Cardiovascular: Negative.        Objective:   /60 (BP Location: Right arm, Patient Position: Sitting)   Pulse 71   Resp 18   Ht 5' 4.02" (1.626 m)   Wt 111.6 kg (246 lb)   SpO2 96%   BMI 42.20 kg/m²     Physical Exam  Vitals " reviewed.   Constitutional:       General: She is not in acute distress.     Appearance: Normal appearance. She is not ill-appearing or diaphoretic.   HENT:      Head: Normocephalic and atraumatic.   Neck:      Vascular: No carotid bruit.   Cardiovascular:      Rate and Rhythm: Normal rate and regular rhythm.      Pulses: Normal pulses.      Heart sounds: Normal heart sounds.   Pulmonary:      Effort: Pulmonary effort is normal.      Breath sounds: Normal breath sounds.   Musculoskeletal:      Right lower leg: No edema.      Left lower leg: No edema.   Lymphadenopathy:      Cervical: No cervical adenopathy.   Skin:     General: Skin is warm and dry.   Neurological:      General: No focal deficit present.      Mental Status: She is alert.   Psychiatric:         Mood and Affect: Mood normal.         Behavior: Behavior normal.         Thought Content: Thought content normal.         Judgment: Judgment normal.       Assessment and Plan:     Depression  Stable, continue cymbalta.    Diabetes mellitus  Controlled.  Continue metformin.    HTN (hypertension)  BP a little on the low side, reduce the lisinopril to10 mg to see if that helps.    Chronic kidney disease, stage 3a  Stable, recheck 6 mos.      Follow up in about 6 months (around 6/4/2025).    Medications Ordered This Encounter   Medications    lisinopriL 10 MG tablet     Sig: Take 1 tablet (10 mg total) by mouth once daily.     Dispense:  30 tablet     Refill:  6     ZERO refills remain on this prescription. Your patient is requesting advance approval of refills for this medication to PREVENT ANY MISSED DOSES     [unfilled]  No orders of the defined types were placed in this encounter.

## 2025-01-18 DIAGNOSIS — M10.9 GOUT, UNSPECIFIED CAUSE, UNSPECIFIED CHRONICITY, UNSPECIFIED SITE: ICD-10-CM

## 2025-01-24 RX ORDER — ALLOPURINOL 100 MG/1
TABLET ORAL
Qty: 90 TABLET | Refills: 3 | Status: SHIPPED | OUTPATIENT
Start: 2025-01-24

## 2025-03-04 DIAGNOSIS — E55.9 VITAMIN D DEFICIENCY: ICD-10-CM

## 2025-03-05 RX ORDER — ERGOCALCIFEROL 1.25 MG/1
50000 CAPSULE ORAL
Qty: 12 CAPSULE | Refills: 3 | Status: SHIPPED | OUTPATIENT
Start: 2025-03-05

## 2025-05-27 ENCOUNTER — TELEPHONE (OUTPATIENT)
Dept: INTERNAL MEDICINE | Facility: CLINIC | Age: 80
End: 2025-05-27
Payer: MEDICARE

## 2025-05-27 NOTE — TELEPHONE ENCOUNTER
----- Message from Nurse Ruth sent at 5/27/2025  1:14 PM CDT -----  Regarding: THOMAS Saleh 6/3/25 @0920  Are there any outstanding tasks in the chart? Patient will need fasting labsIs there any documentation of tasks? noPlease tell patient to bring living will, power of , or advance directive document to visit if they have it.

## 2025-05-30 ENCOUNTER — LAB VISIT (OUTPATIENT)
Dept: LAB | Facility: HOSPITAL | Age: 80
End: 2025-05-30
Attending: INTERNAL MEDICINE
Payer: MEDICARE

## 2025-05-30 DIAGNOSIS — N18.31 CHRONIC KIDNEY DISEASE, STAGE 3A: ICD-10-CM

## 2025-05-30 DIAGNOSIS — E78.5 HYPERLIPIDEMIA, UNSPECIFIED HYPERLIPIDEMIA TYPE: ICD-10-CM

## 2025-05-30 DIAGNOSIS — E11.9 TYPE 2 DIABETES MELLITUS WITHOUT COMPLICATION, WITHOUT LONG-TERM CURRENT USE OF INSULIN: ICD-10-CM

## 2025-05-30 DIAGNOSIS — I10 PRIMARY HYPERTENSION: ICD-10-CM

## 2025-05-30 LAB
ALBUMIN SERPL-MCNC: 3.6 G/DL (ref 3.4–4.8)
ALBUMIN/GLOB SERPL: 1.2 RATIO (ref 1.1–2)
ALP SERPL-CCNC: 76 UNIT/L (ref 40–150)
ALT SERPL-CCNC: 11 UNIT/L (ref 0–55)
ANION GAP SERPL CALC-SCNC: 15 MEQ/L
AST SERPL-CCNC: 14 UNIT/L (ref 11–45)
BASOPHILS # BLD AUTO: 0.1 X10(3)/MCL
BASOPHILS NFR BLD AUTO: 1 %
BILIRUB SERPL-MCNC: 0.5 MG/DL
BUN SERPL-MCNC: 19.6 MG/DL (ref 9.8–20.1)
CALCIUM SERPL-MCNC: 9.6 MG/DL (ref 8.4–10.2)
CHLORIDE SERPL-SCNC: 106 MMOL/L (ref 98–107)
CHOLEST SERPL-MCNC: 134 MG/DL
CHOLEST/HDLC SERPL: 3 {RATIO} (ref 0–5)
CO2 SERPL-SCNC: 22 MMOL/L (ref 23–31)
CREAT SERPL-MCNC: 1.18 MG/DL (ref 0.55–1.02)
CREAT UR-MCNC: 193.1 MG/DL (ref 45–106)
CREAT/UREA NIT SERPL: 17
EOSINOPHIL # BLD AUTO: 0.18 X10(3)/MCL (ref 0–0.9)
EOSINOPHIL NFR BLD AUTO: 1.8 %
ERYTHROCYTE [DISTWIDTH] IN BLOOD BY AUTOMATED COUNT: 15.2 % (ref 11.5–17)
EST. AVERAGE GLUCOSE BLD GHB EST-MCNC: 131.2 MG/DL
GFR SERPLBLD CREATININE-BSD FMLA CKD-EPI: 48 ML/MIN/1.73/M2
GLOBULIN SER-MCNC: 3 GM/DL (ref 2.4–3.5)
GLUCOSE SERPL-MCNC: 132 MG/DL (ref 82–115)
HBA1C MFR BLD: 6.2 %
HCT VFR BLD AUTO: 48.2 % (ref 37–47)
HDLC SERPL-MCNC: 46 MG/DL (ref 35–60)
HGB BLD-MCNC: 14.9 G/DL (ref 12–16)
IMM GRANULOCYTES # BLD AUTO: 0.06 X10(3)/MCL (ref 0–0.04)
IMM GRANULOCYTES NFR BLD AUTO: 0.6 %
LDLC SERPL CALC-MCNC: 57 MG/DL (ref 50–140)
LYMPHOCYTES # BLD AUTO: 2.12 X10(3)/MCL (ref 0.6–4.6)
LYMPHOCYTES NFR BLD AUTO: 21.3 %
MCH RBC QN AUTO: 28.5 PG (ref 27–31)
MCHC RBC AUTO-ENTMCNC: 30.9 G/DL (ref 33–36)
MCV RBC AUTO: 92.3 FL (ref 80–94)
MICROALBUMIN UR-MCNC: 18.4 UG/ML
MICROALBUMIN/CREAT RATIO PNL UR: 9.5 MG/GM CR (ref 0–30)
MONOCYTES # BLD AUTO: 0.5 X10(3)/MCL (ref 0.1–1.3)
MONOCYTES NFR BLD AUTO: 5 %
NEUTROPHILS # BLD AUTO: 6.99 X10(3)/MCL (ref 2.1–9.2)
NEUTROPHILS NFR BLD AUTO: 70.3 %
NRBC BLD AUTO-RTO: 0 %
PLATELET # BLD AUTO: 253 X10(3)/MCL (ref 130–400)
PMV BLD AUTO: 10.6 FL (ref 7.4–10.4)
POTASSIUM SERPL-SCNC: 4.4 MMOL/L (ref 3.5–5.1)
PROT SERPL-MCNC: 6.6 GM/DL (ref 5.8–7.6)
RBC # BLD AUTO: 5.22 X10(6)/MCL (ref 4.2–5.4)
SODIUM SERPL-SCNC: 143 MMOL/L (ref 136–145)
TRIGL SERPL-MCNC: 153 MG/DL (ref 37–140)
VLDLC SERPL CALC-MCNC: 31 MG/DL
WBC # BLD AUTO: 9.95 X10(3)/MCL (ref 4.5–11.5)

## 2025-05-30 PROCEDURE — 36415 COLL VENOUS BLD VENIPUNCTURE: CPT

## 2025-05-30 PROCEDURE — 82570 ASSAY OF URINE CREATININE: CPT

## 2025-05-30 PROCEDURE — 80061 LIPID PANEL: CPT

## 2025-05-30 PROCEDURE — 85025 COMPLETE CBC W/AUTO DIFF WBC: CPT

## 2025-05-30 PROCEDURE — 80053 COMPREHEN METABOLIC PANEL: CPT

## 2025-05-30 PROCEDURE — 83036 HEMOGLOBIN GLYCOSYLATED A1C: CPT

## 2025-07-02 DIAGNOSIS — I10 PRIMARY HYPERTENSION: ICD-10-CM

## 2025-07-02 RX ORDER — LISINOPRIL 10 MG/1
10 TABLET ORAL
Qty: 30 TABLET | Refills: 6 | Status: SHIPPED | OUTPATIENT
Start: 2025-07-02

## 2025-08-18 ENCOUNTER — TELEPHONE (OUTPATIENT)
Dept: INTERNAL MEDICINE | Facility: CLINIC | Age: 80
End: 2025-08-18
Payer: MEDICARE

## 2025-08-21 ENCOUNTER — OFFICE VISIT (OUTPATIENT)
Dept: INTERNAL MEDICINE | Facility: CLINIC | Age: 80
End: 2025-08-21
Payer: MEDICARE

## 2025-08-21 VITALS
OXYGEN SATURATION: 99 % | RESPIRATION RATE: 18 BRPM | WEIGHT: 246 LBS | SYSTOLIC BLOOD PRESSURE: 128 MMHG | HEART RATE: 96 BPM | HEIGHT: 64 IN | DIASTOLIC BLOOD PRESSURE: 76 MMHG | BODY MASS INDEX: 42 KG/M2

## 2025-08-21 DIAGNOSIS — E55.9 VITAMIN D DEFICIENCY: ICD-10-CM

## 2025-08-21 DIAGNOSIS — E78.5 HYPERLIPIDEMIA, UNSPECIFIED HYPERLIPIDEMIA TYPE: ICD-10-CM

## 2025-08-21 DIAGNOSIS — Z00.00 ENCOUNTER FOR MEDICARE ANNUAL WELLNESS EXAM: Primary | ICD-10-CM

## 2025-08-21 DIAGNOSIS — I48.91 ATRIAL FIBRILLATION, UNSPECIFIED TYPE: ICD-10-CM

## 2025-08-21 DIAGNOSIS — I10 PRIMARY HYPERTENSION: ICD-10-CM

## 2025-08-21 DIAGNOSIS — M10.9 GOUT, UNSPECIFIED CAUSE, UNSPECIFIED CHRONICITY, UNSPECIFIED SITE: ICD-10-CM

## 2025-08-21 DIAGNOSIS — E11.9 TYPE 2 DIABETES MELLITUS WITHOUT COMPLICATION, WITHOUT LONG-TERM CURRENT USE OF INSULIN: ICD-10-CM

## 2025-08-21 DIAGNOSIS — N18.31 CHRONIC KIDNEY DISEASE, STAGE 3A: ICD-10-CM
